# Patient Record
Sex: FEMALE | Race: WHITE | NOT HISPANIC OR LATINO | Employment: FULL TIME | URBAN - METROPOLITAN AREA
[De-identification: names, ages, dates, MRNs, and addresses within clinical notes are randomized per-mention and may not be internally consistent; named-entity substitution may affect disease eponyms.]

---

## 2017-06-30 ENCOUNTER — HOSPITAL ENCOUNTER (EMERGENCY)
Facility: HOSPITAL | Age: 47
Discharge: HOME | End: 2017-07-01
Payer: MEDICAID

## 2017-06-30 DIAGNOSIS — R51.9 HEADACHE: ICD-10-CM

## 2017-06-30 DIAGNOSIS — R19.7 DIARRHEA, UNSPECIFIED TYPE: ICD-10-CM

## 2017-06-30 DIAGNOSIS — R11.0 NAUSEA: ICD-10-CM

## 2017-06-30 DIAGNOSIS — B34.9 VIRAL SYNDROME: Primary | ICD-10-CM

## 2017-06-30 PROCEDURE — 96374 THER/PROPH/DIAG INJ IV PUSH: CPT

## 2017-06-30 PROCEDURE — 99284 EMERGENCY DEPT VISIT MOD MDM: CPT | Mod: 25

## 2017-06-30 PROCEDURE — 96361 HYDRATE IV INFUSION ADD-ON: CPT

## 2017-06-30 PROCEDURE — 96375 TX/PRO/DX INJ NEW DRUG ADDON: CPT

## 2017-07-01 VITALS
OXYGEN SATURATION: 97 % | HEART RATE: 57 BPM | DIASTOLIC BLOOD PRESSURE: 79 MMHG | RESPIRATION RATE: 18 BRPM | TEMPERATURE: 98 F | WEIGHT: 174 LBS | BODY MASS INDEX: 30.83 KG/M2 | HEIGHT: 63 IN | SYSTOLIC BLOOD PRESSURE: 118 MMHG

## 2017-07-01 LAB
ALBUMIN SERPL BCP-MCNC: 3.6 G/DL
ALP SERPL-CCNC: 52 U/L
ALT SERPL W/O P-5'-P-CCNC: 29 U/L
ANION GAP SERPL CALC-SCNC: 8 MMOL/L
AST SERPL-CCNC: 22 U/L
B-HCG UR QL: NEGATIVE
BASOPHILS # BLD AUTO: 0.02 K/UL
BASOPHILS NFR BLD: 0.3 %
BILIRUB SERPL-MCNC: 0.3 MG/DL
BILIRUB UR QL STRIP: NEGATIVE
BUN SERPL-MCNC: 11 MG/DL
CALCIUM SERPL-MCNC: 9.1 MG/DL
CHLORIDE SERPL-SCNC: 107 MMOL/L
CLARITY UR: CLEAR
CO2 SERPL-SCNC: 23 MMOL/L
COLOR UR: YELLOW
CREAT SERPL-MCNC: 0.8 MG/DL
DIFFERENTIAL METHOD: ABNORMAL
EOSINOPHIL # BLD AUTO: 0.1 K/UL
EOSINOPHIL NFR BLD: 2.1 %
ERYTHROCYTE [DISTWIDTH] IN BLOOD BY AUTOMATED COUNT: 12.8 %
EST. GFR  (AFRICAN AMERICAN): >60 ML/MIN/1.73 M^2
EST. GFR  (NON AFRICAN AMERICAN): >60 ML/MIN/1.73 M^2
GLUCOSE SERPL-MCNC: 83 MG/DL
GLUCOSE UR QL STRIP: NEGATIVE
HCT VFR BLD AUTO: 35.4 %
HGB BLD-MCNC: 12.5 G/DL
HGB UR QL STRIP: ABNORMAL
KETONES UR QL STRIP: NEGATIVE
LEUKOCYTE ESTERASE UR QL STRIP: NEGATIVE
LYMPHOCYTES # BLD AUTO: 2.5 K/UL
LYMPHOCYTES NFR BLD: 40.8 %
MCH RBC QN AUTO: 32.6 PG
MCHC RBC AUTO-ENTMCNC: 35.3 %
MCV RBC AUTO: 92 FL
MONOCYTES # BLD AUTO: 0.5 K/UL
MONOCYTES NFR BLD: 8.6 %
NEUTROPHILS # BLD AUTO: 2.9 K/UL
NEUTROPHILS NFR BLD: 48.2 %
NITRITE UR QL STRIP: NEGATIVE
PH UR STRIP: 7 [PH] (ref 5–8)
PLATELET # BLD AUTO: 214 K/UL
PMV BLD AUTO: 10.5 FL
POTASSIUM SERPL-SCNC: 3.7 MMOL/L
PROT SERPL-MCNC: 7.1 G/DL
PROT UR QL STRIP: NEGATIVE
RBC # BLD AUTO: 3.84 M/UL
SODIUM SERPL-SCNC: 138 MMOL/L
SP GR UR STRIP: 1.01 (ref 1–1.03)
URN SPEC COLLECT METH UR: ABNORMAL
UROBILINOGEN UR STRIP-ACNC: NEGATIVE EU/DL
WBC # BLD AUTO: 6.08 K/UL

## 2017-07-01 PROCEDURE — 25000003 PHARM REV CODE 250: Performed by: PHYSICIAN ASSISTANT

## 2017-07-01 PROCEDURE — 81025 URINE PREGNANCY TEST: CPT

## 2017-07-01 PROCEDURE — 63600175 PHARM REV CODE 636 W HCPCS: Performed by: PHYSICIAN ASSISTANT

## 2017-07-01 PROCEDURE — 80053 COMPREHEN METABOLIC PANEL: CPT

## 2017-07-01 PROCEDURE — 81003 URINALYSIS AUTO W/O SCOPE: CPT

## 2017-07-01 PROCEDURE — 85025 COMPLETE CBC W/AUTO DIFF WBC: CPT

## 2017-07-01 RX ORDER — KETOROLAC TROMETHAMINE 30 MG/ML
30 INJECTION, SOLUTION INTRAMUSCULAR; INTRAVENOUS
Status: COMPLETED | OUTPATIENT
Start: 2017-07-01 | End: 2017-07-01

## 2017-07-01 RX ORDER — METOCLOPRAMIDE 10 MG/1
10 TABLET ORAL EVERY 6 HOURS PRN
Qty: 10 TABLET | Refills: 0 | Status: SHIPPED | OUTPATIENT
Start: 2017-07-01

## 2017-07-01 RX ORDER — METOCLOPRAMIDE HYDROCHLORIDE 5 MG/ML
10 INJECTION INTRAMUSCULAR; INTRAVENOUS
Status: COMPLETED | OUTPATIENT
Start: 2017-07-01 | End: 2017-07-01

## 2017-07-01 RX ADMIN — KETOROLAC TROMETHAMINE 30 MG: 30 INJECTION, SOLUTION INTRAMUSCULAR; INTRAVENOUS at 03:07

## 2017-07-01 RX ADMIN — SODIUM CHLORIDE 1000 ML: 0.9 INJECTION, SOLUTION INTRAVENOUS at 01:07

## 2017-07-01 RX ADMIN — METOCLOPRAMIDE 10 MG: 5 INJECTION, SOLUTION INTRAMUSCULAR; INTRAVENOUS at 01:07

## 2017-07-01 NOTE — ED PROVIDER NOTES
History      Chief Complaint   Patient presents with    Headache     HA, n/v/d, chills, decreased appetite, x3 days       Review of patient's allergies indicates:  No Known Allergies     HPI   HPI    7/1/2017, 12:24 AM   History obtained from the patient      History of Present Illness: Morenita White is a 46 y.o. female patient who presents to the Emergency Department for headache for 3 days.  She denies hx of headaches  Also nausea, decreased appetite for 3 days. She was sent by urgent care for workup.  SHe denies neck stiffness, head injury, fever, vomiting.  Triage note says vomiting but pt denies, says only nausea and diarrhea. Symptoms are moderate in severity.     No further complaints or concerns at this time.           PCP: Benitez Harris NP       Past Medical History:  No past medical history on file.      Past Surgical History:  No past surgical history on file.        Family History:  No family history on file.        Social History:  Social History     Social History Main Topics    Smoking status: Not on file    Smokeless tobacco: Not on file    Alcohol use Not on file    Drug use: Unknown    Sexual activity: Not on file       ROS     Review of Systems   Constitutional: Positive for appetite change and chills. Negative for fever.   HENT: Negative for sore throat.    Respiratory: Negative for shortness of breath.    Cardiovascular: Negative for chest pain.   Gastrointestinal: Positive for diarrhea and nausea. Negative for vomiting.   Genitourinary: Negative for dysuria.   Musculoskeletal: Negative for back pain.   Skin: Negative for rash.   Neurological: Positive for headaches. Negative for dizziness, tremors, seizures, syncope, facial asymmetry, speech difficulty, weakness, light-headedness and numbness.   Hematological: Does not bruise/bleed easily.   All other systems reviewed and are negative.      Physical Exam      Initial Vitals [06/30/17 2300]   BP Pulse Resp Temp SpO2   (!) 142/73  "74 18 97.9 °F (36.6 °C) 97 %      MAP       96         Physical Exam  Vital signs and nursing notes reviewed.  Constitutional: Patient is in NAD. Awake and alert. Well-developed and well-nourished.  Head: Atraumatic. Normocephalic.  Eyes: PERRL. EOM intact. Conjunctivae nl. No scleral icterus.  ENT: Mucous membranes are moist. Oropharynx is clear.  TMs clear  Neck: Supple. No JVD. No lymphadenopathy.  No meningismus  Cardiovascular: Regular rate and rhythm. No murmurs, rubs, or gallops. Distal pulses are 2+ and symmetric.  Pulmonary/Chest: No respiratory distress. Clear to auscultation bilaterally. No wheezing, rales, or rhonchi.  Abdominal: Soft. Non-distended. No TTP. No rebound, guarding, or rigidity. Good bowel sounds.  Genitourinary: No CVA tenderness  Musculoskeletal: Moves all extremities. No edema.   Skin: Warm and dry.  Neurological: Awake and alert. No acute focal neurological deficits are appreciated. No facial droop.  Tongue is midline.  No pronator drift.  Finger to nose normal.  Hand  equal and strong, 5/5 motor strength x 4.      Psychiatric: Normal affect. Good eye contact. Appropriate in content.      ED Course          Procedures  ED Vital Signs:  Vitals:    06/30/17 2300 07/01/17 0323   BP: (!) 142/73 118/79   Pulse: 74 (!) 57   Resp: 18 18   Temp: 97.9 °F (36.6 °C)    TempSrc: Oral    SpO2: 97% 97%   Weight: 78.9 kg (174 lb)    Height: 5' 3" (1.6 m)          Results for orders placed or performed during the hospital encounter of 06/30/17   CBC auto differential   Result Value Ref Range    WBC 6.08 3.90 - 12.70 K/uL    RBC 3.84 (L) 4.00 - 5.40 M/uL    Hemoglobin 12.5 12.0 - 16.0 g/dL    Hematocrit 35.4 (L) 37.0 - 48.5 %    MCV 92 82 - 98 fL    MCH 32.6 (H) 27.0 - 31.0 pg    MCHC 35.3 32.0 - 36.0 %    RDW 12.8 11.5 - 14.5 %    Platelets 214 150 - 350 K/uL    MPV 10.5 9.2 - 12.9 fL    Gran # 2.9 1.8 - 7.7 K/uL    Lymph # 2.5 1.0 - 4.8 K/uL    Mono # 0.5 0.3 - 1.0 K/uL    Eos # 0.1 0.0 - 0.5 " K/uL    Baso # 0.02 0.00 - 0.20 K/uL    Gran% 48.2 38.0 - 73.0 %    Lymph% 40.8 18.0 - 48.0 %    Mono% 8.6 4.0 - 15.0 %    Eosinophil% 2.1 0.0 - 8.0 %    Basophil% 0.3 0.0 - 1.9 %    Differential Method Automated    Comprehensive metabolic panel   Result Value Ref Range    Sodium 138 136 - 145 mmol/L    Potassium 3.7 3.5 - 5.1 mmol/L    Chloride 107 95 - 110 mmol/L    CO2 23 23 - 29 mmol/L    Glucose 83 70 - 110 mg/dL    BUN, Bld 11 6 - 20 mg/dL    Creatinine 0.8 0.5 - 1.4 mg/dL    Calcium 9.1 8.7 - 10.5 mg/dL    Total Protein 7.1 6.0 - 8.4 g/dL    Albumin 3.6 3.5 - 5.2 g/dL    Total Bilirubin 0.3 0.1 - 1.0 mg/dL    Alkaline Phosphatase 52 (L) 55 - 135 U/L    AST 22 10 - 40 U/L    ALT 29 10 - 44 U/L    Anion Gap 8 8 - 16 mmol/L    eGFR if African American >60 >60 mL/min/1.73 m^2    eGFR if non African American >60 >60 mL/min/1.73 m^2   Urinalysis   Result Value Ref Range    Specimen UA Urine, Clean Catch     Color, UA Yellow Yellow, Straw, Mony    Appearance, UA Clear Clear    pH, UA 7.0 5.0 - 8.0    Specific Gravity, UA 1.015 1.005 - 1.030    Protein, UA Negative Negative    Glucose, UA Negative Negative    Ketones, UA Negative Negative    Bilirubin (UA) Negative Negative    Occult Blood UA Trace (A) Negative    Nitrite, UA Negative Negative    Urobilinogen, UA Negative <2.0 EU/dL    Leukocytes, UA Negative Negative   Pregnancy, urine rapid   Result Value Ref Range    Preg Test, Ur Negative            Imaging Results:  Imaging Results          CT Head Without Contrast (Final result)  Result time 07/01/17 08:53:36    Final result by David Mahoney MD (07/01/17 08:53:36)                 Impression:     Normal CT of the brain      Electronically signed by: DAVID MAHONEY MD  Date:     07/01/17  Time:    08:53              Narrative:    CT of the head without contrast    Clinical indication: Headache x3 days    Findings: The ventricles are normal in size and position without midline shift.  Cerebral density is  normal.  No hemorrhage or mass effect.                                 The Emergency Provider reviewed the vital signs and test results, which are outlined above.    ED Discussion             Medication(s) given in the ER:  Medications   sodium chloride 0.9% bolus 1,000 mL (0 mLs Intravenous Stopped 7/1/17 0255)   metoclopramide HCl injection 10 mg (10 mg Intravenous Given 7/1/17 0113)   ketorolac injection 30 mg (30 mg Intravenous Given 7/1/17 0319)           Follow-up Information     Benitez Harris NP In 2 days.    Specialty:  Family Medicine  Contact information:  [address] [facility]  [place] [address]  [phone]             [facility]    Specialty:  Emergency Medicine  Why:  If symptoms worsen  Contact information:  [address]  [facility] [address] [phone]                     Discharge Medication List as of 7/1/2017  3:06 AM      START taking these medications    Details   metoclopramide HCl (REGLAN) 10 MG tablet Take 1 tablet (10 mg total) by mouth every 6 (six) hours as needed. Prn headache or nausea, Starting Sat 7/1/2017, Print                Medical Decision Making      Pt says she feels much better and ready for discharge.  Headache resolved.    All findings were reviewed with the patient/family in detail.   All remaining questions and concerns were addressed at that time.  Patient/family has been counseled regarding the need for follow-up as well as the indication to return to the emergency room should new or worrisome developments occur.        MDM               Clinical Impression:        ICD-10-CM ICD-9-CM   1. Viral syndrome B34.9 079.99   2. Headache R51 784.0   3. Diarrhea, unspecified type R19.7 787.91   4. Nausea R11.0 787.02             Andressa Forrester PA-C  07/01/17 2040

## 2017-08-13 ENCOUNTER — HOSPITAL ENCOUNTER (EMERGENCY)
Facility: HOSPITAL | Age: 47
Discharge: HOME | End: 2017-08-13
Attending: EMERGENCY MEDICINE
Payer: MEDICAID

## 2017-08-13 VITALS
TEMPERATURE: 99 F | WEIGHT: 170 LBS | HEART RATE: 94 BPM | OXYGEN SATURATION: 97 % | RESPIRATION RATE: 20 BRPM | BODY MASS INDEX: 30.12 KG/M2 | DIASTOLIC BLOOD PRESSURE: 61 MMHG | SYSTOLIC BLOOD PRESSURE: 109 MMHG | HEIGHT: 63 IN

## 2017-08-13 DIAGNOSIS — R50.9 FEVER: ICD-10-CM

## 2017-08-13 DIAGNOSIS — J06.9 UPPER RESPIRATORY TRACT INFECTION, UNSPECIFIED TYPE: Primary | ICD-10-CM

## 2017-08-13 LAB
ALBUMIN SERPL BCP-MCNC: 3.4 G/DL
ALP SERPL-CCNC: 139 U/L
ALT SERPL W/O P-5'-P-CCNC: 299 U/L
ANION GAP SERPL CALC-SCNC: 10 MMOL/L
AST SERPL-CCNC: 394 U/L
BASOPHILS # BLD AUTO: 0.02 K/UL
BASOPHILS NFR BLD: 0.8 %
BILIRUB SERPL-MCNC: 0.3 MG/DL
BILIRUB UR QL STRIP: NEGATIVE
BUN SERPL-MCNC: 9 MG/DL
CALCIUM SERPL-MCNC: 8.6 MG/DL
CHLORIDE SERPL-SCNC: 106 MMOL/L
CLARITY UR: CLEAR
CO2 SERPL-SCNC: 20 MMOL/L
COLOR UR: YELLOW
CREAT SERPL-MCNC: 0.8 MG/DL
DIFFERENTIAL METHOD: ABNORMAL
EOSINOPHIL # BLD AUTO: 0 K/UL
EOSINOPHIL NFR BLD: 0 %
ERYTHROCYTE [DISTWIDTH] IN BLOOD BY AUTOMATED COUNT: 13.1 %
EST. GFR  (AFRICAN AMERICAN): >60 ML/MIN/1.73 M^2
EST. GFR  (NON AFRICAN AMERICAN): >60 ML/MIN/1.73 M^2
FLUAV AG SPEC QL IA: NEGATIVE
FLUBV AG SPEC QL IA: NEGATIVE
GLUCOSE SERPL-MCNC: 92 MG/DL
GLUCOSE UR QL STRIP: NEGATIVE
HCT VFR BLD AUTO: 33.7 %
HETEROPH AB SERPL QL IA: NEGATIVE
HGB BLD-MCNC: 11.7 G/DL
HGB UR QL STRIP: ABNORMAL
KETONES UR QL STRIP: ABNORMAL
LACTATE SERPL-SCNC: 0.8 MMOL/L
LEUKOCYTE ESTERASE UR QL STRIP: ABNORMAL
LYMPHOCYTES # BLD AUTO: 0.2 K/UL
LYMPHOCYTES NFR BLD: 8.7 %
MCH RBC QN AUTO: 32.6 PG
MCHC RBC AUTO-ENTMCNC: 34.7 G/DL
MCV RBC AUTO: 94 FL
MICROSCOPIC COMMENT: ABNORMAL
MONOCYTES # BLD AUTO: 0.2 K/UL
MONOCYTES NFR BLD: 8.7 %
NEUTROPHILS # BLD AUTO: 2.1 K/UL
NEUTROPHILS NFR BLD: 81.8 %
NITRITE UR QL STRIP: NEGATIVE
PH UR STRIP: 6 [PH] (ref 5–8)
PLATELET # BLD AUTO: 134 K/UL
PMV BLD AUTO: 10.5 FL
POTASSIUM SERPL-SCNC: 3.8 MMOL/L
PROT SERPL-MCNC: 6.8 G/DL
PROT UR QL STRIP: ABNORMAL
RBC # BLD AUTO: 3.59 M/UL
RBC #/AREA URNS HPF: 9 /HPF (ref 0–4)
SODIUM SERPL-SCNC: 136 MMOL/L
SP GR UR STRIP: 1.01 (ref 1–1.03)
SPECIMEN SOURCE: NORMAL
URN SPEC COLLECT METH UR: ABNORMAL
UROBILINOGEN UR STRIP-ACNC: NEGATIVE EU/DL
WBC # BLD AUTO: 2.53 K/UL
WBC #/AREA URNS HPF: 3 /HPF (ref 0–5)

## 2017-08-13 PROCEDURE — 99284 EMERGENCY DEPT VISIT MOD MDM: CPT | Mod: 25

## 2017-08-13 PROCEDURE — 81000 URINALYSIS NONAUTO W/SCOPE: CPT

## 2017-08-13 PROCEDURE — 87400 INFLUENZA A/B EACH AG IA: CPT

## 2017-08-13 PROCEDURE — 86308 HETEROPHILE ANTIBODY SCREEN: CPT

## 2017-08-13 PROCEDURE — 25000003 PHARM REV CODE 250: Performed by: EMERGENCY MEDICINE

## 2017-08-13 PROCEDURE — 36415 COLL VENOUS BLD VENIPUNCTURE: CPT

## 2017-08-13 PROCEDURE — 87040 BLOOD CULTURE FOR BACTERIA: CPT | Mod: 59

## 2017-08-13 PROCEDURE — 85025 COMPLETE CBC W/AUTO DIFF WBC: CPT

## 2017-08-13 PROCEDURE — 96374 THER/PROPH/DIAG INJ IV PUSH: CPT

## 2017-08-13 PROCEDURE — 80053 COMPREHEN METABOLIC PANEL: CPT

## 2017-08-13 PROCEDURE — 83605 ASSAY OF LACTIC ACID: CPT

## 2017-08-13 PROCEDURE — 63600175 PHARM REV CODE 636 W HCPCS: Performed by: EMERGENCY MEDICINE

## 2017-08-13 RX ORDER — BENZONATATE 100 MG/1
100 CAPSULE ORAL 3 TIMES DAILY PRN
Qty: 20 CAPSULE | Refills: 0 | Status: SHIPPED | OUTPATIENT
Start: 2017-08-13 | End: 2017-08-23

## 2017-08-13 RX ORDER — IBUPROFEN 600 MG/1
600 TABLET ORAL
Status: COMPLETED | OUTPATIENT
Start: 2017-08-13 | End: 2017-08-13

## 2017-08-13 RX ORDER — CITALOPRAM 10 MG/1
10 TABLET ORAL DAILY
COMMUNITY

## 2017-08-13 RX ORDER — DOXEPIN HYDROCHLORIDE 25 MG/1
25 CAPSULE ORAL NIGHTLY
COMMUNITY

## 2017-08-13 RX ORDER — ONDANSETRON 4 MG/1
4 TABLET, FILM COATED ORAL EVERY 8 HOURS PRN
Qty: 12 TABLET | Refills: 0 | Status: SHIPPED | OUTPATIENT
Start: 2017-08-13

## 2017-08-13 RX ORDER — KETOROLAC TROMETHAMINE 30 MG/ML
30 INJECTION, SOLUTION INTRAMUSCULAR; INTRAVENOUS
Status: COMPLETED | OUTPATIENT
Start: 2017-08-13 | End: 2017-08-13

## 2017-08-13 RX ADMIN — SODIUM CHLORIDE 1000 ML: 0.9 INJECTION, SOLUTION INTRAVENOUS at 07:08

## 2017-08-13 RX ADMIN — KETOROLAC TROMETHAMINE 30 MG: 30 INJECTION, SOLUTION INTRAMUSCULAR at 09:08

## 2017-08-13 RX ADMIN — IBUPROFEN 600 MG: 600 TABLET, FILM COATED ORAL at 07:08

## 2017-08-14 ENCOUNTER — HOSPITAL ENCOUNTER (EMERGENCY)
Facility: HOSPITAL | Age: 47
Discharge: HOME | End: 2017-08-14
Attending: EMERGENCY MEDICINE
Payer: MEDICAID

## 2017-08-14 VITALS
HEART RATE: 102 BPM | RESPIRATION RATE: 22 BRPM | SYSTOLIC BLOOD PRESSURE: 132 MMHG | HEIGHT: 63 IN | WEIGHT: 170 LBS | TEMPERATURE: 99 F | BODY MASS INDEX: 30.12 KG/M2 | OXYGEN SATURATION: 96 % | DIASTOLIC BLOOD PRESSURE: 76 MMHG

## 2017-08-14 DIAGNOSIS — B34.9 ACUTE VIRAL SYNDROME: Primary | ICD-10-CM

## 2017-08-14 DIAGNOSIS — J06.9 UPPER RESPIRATORY TRACT INFECTION, UNSPECIFIED TYPE: ICD-10-CM

## 2017-08-14 PROCEDURE — 63600175 PHARM REV CODE 636 W HCPCS: Performed by: EMERGENCY MEDICINE

## 2017-08-14 PROCEDURE — 96372 THER/PROPH/DIAG INJ SC/IM: CPT

## 2017-08-14 PROCEDURE — 99283 EMERGENCY DEPT VISIT LOW MDM: CPT | Mod: 25

## 2017-08-14 RX ORDER — PROMETHAZINE HYDROCHLORIDE AND DEXTROMETHORPHAN HYDROBROMIDE 6.25; 15 MG/5ML; MG/5ML
5 SYRUP ORAL
Qty: 120 ML | Refills: 0 | Status: SHIPPED | OUTPATIENT
Start: 2017-08-14 | End: 2017-08-24

## 2017-08-14 RX ORDER — DEXAMETHASONE SODIUM PHOSPHATE 4 MG/ML
8 INJECTION, SOLUTION INTRA-ARTICULAR; INTRALESIONAL; INTRAMUSCULAR; INTRAVENOUS; SOFT TISSUE
Status: COMPLETED | OUTPATIENT
Start: 2017-08-14 | End: 2017-08-14

## 2017-08-14 RX ADMIN — DEXAMETHASONE SODIUM PHOSPHATE 8 MG: 4 INJECTION, SOLUTION INTRA-ARTICULAR; INTRALESIONAL; INTRAMUSCULAR; INTRAVENOUS; SOFT TISSUE at 10:08

## 2017-08-14 NOTE — DISCHARGE INSTRUCTIONS
Regarding UPPER RESPIRATORY ILLNESS, for treatment, I encouraged patient to: drink plenty of fluids; get lots of rest; take medications as prescribed; use over-the-counter medications for symptomatic treatment;  and use a humidifier or steam in the bathroom to improve patency of upper airway.  Patient instructed to notify primary care provider, or return to the emergency department, if the they: have a cough most days or have a cough that returns frequently; begin coughing up blood; develop a high fever or shaking chills; have a low-grade fever for three or more days; develop thick, greenish mucus, especially if it has a bad smell; and experience shortness of breath or chest pain. For prevention, discussed with patient the importance of refraining from smoking (if applicable), getting annual influenza vaccines, reducing exposure to air pollution, and the need to frequently wash hands to avoid spread of infection.     Regarding FEVER, instructed patient and/or caregiver on techniques to manage elevated temperatures, including: bathing in cool or lukewarm water; using an ice pack wrapped in a small towel or wet a washcloth with cool water on forehead or the back of neck; drink liquids as directed to help prevent dehydration. Recommended that the patient seek immediate care if they experience any of the following symptoms: shortness of breath or chest pain; blue skin, lips, or nails; stiff neck and a bad headache; fever does not go away or gets worse even after treatment; confusion; decrease urinary output; and tachycardia. For infection prevention, I suggested the frequent use hand hygiene (washing hands often with soap and water and/or use an alcohol-based gel; wear a mask to help prevent the spread of a virus; and if applicable, cook and handle food properly and clean surfaces where food is prepared with a disinfectant . For management, discussed use of antipyretics and reiterated importance of taking  medications as directed.    Rest  Drink plenty of clear fluids   Nasal saline spray to clear nasal drainage and help with nasal congestion  Zyrtec or Claritin to help dry mucus and post nasal drip  Mucinex or Mucinex DM for cough and chest congestion  Tylenol or Ibuprofen for fever, headache and body aches  Warm salt water gargles for throat comfort  Chloraseptic spray or lozenges for throat comfort  RTC with no improvement or worsening

## 2017-08-14 NOTE — ED NOTES
Pt states started last night with dry cough, headache and fever. States feels like hurting all over. States had a fever of 103.1 prior to arrival.  BBS = clear.   Armband checked, patient verified. Verified by spelling and stated name on armband along with .   Patient sitting up in bed, no acute distress noted, awake, alert, and oriented x 3, calm, respirations even and unlabored, and skin is warm and dry. Patient verbalized understanding of status and plan of care. Side rails up x 2, call light in reach, bed low and locked. Family at bedside. Will continue to monitor.
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
## 2017-08-19 LAB
BACTERIA BLD CULT: NORMAL
BACTERIA BLD CULT: NORMAL

## 2018-02-06 ENCOUNTER — HOSPITAL ENCOUNTER (EMERGENCY)
Facility: HOSPITAL | Age: 48
Discharge: HOME | End: 2018-02-06
Attending: EMERGENCY MEDICINE
Payer: MEDICAID

## 2018-02-06 VITALS
SYSTOLIC BLOOD PRESSURE: 130 MMHG | DIASTOLIC BLOOD PRESSURE: 76 MMHG | HEIGHT: 63 IN | TEMPERATURE: 98 F | RESPIRATION RATE: 18 BRPM | HEART RATE: 79 BPM | BODY MASS INDEX: 31.13 KG/M2 | OXYGEN SATURATION: 98 % | WEIGHT: 175.69 LBS

## 2018-02-06 DIAGNOSIS — J20.9 ACUTE BRONCHITIS WITH COPD: Primary | ICD-10-CM

## 2018-02-06 DIAGNOSIS — R06.02 SOB (SHORTNESS OF BREATH): ICD-10-CM

## 2018-02-06 DIAGNOSIS — J44.0 ACUTE BRONCHITIS WITH COPD: Primary | ICD-10-CM

## 2018-02-06 PROCEDURE — 99284 EMERGENCY DEPT VISIT MOD MDM: CPT | Mod: 25

## 2018-02-06 PROCEDURE — 93010 ELECTROCARDIOGRAM REPORT: CPT | Mod: ,,, | Performed by: INTERNAL MEDICINE

## 2018-02-06 PROCEDURE — 93005 ELECTROCARDIOGRAM TRACING: CPT

## 2018-02-06 PROCEDURE — 94640 AIRWAY INHALATION TREATMENT: CPT

## 2018-02-06 PROCEDURE — 63600175 PHARM REV CODE 636 W HCPCS: Performed by: EMERGENCY MEDICINE

## 2018-02-06 PROCEDURE — 96372 THER/PROPH/DIAG INJ SC/IM: CPT

## 2018-02-06 PROCEDURE — 25000242 PHARM REV CODE 250 ALT 637 W/ HCPCS: Performed by: EMERGENCY MEDICINE

## 2018-02-06 RX ORDER — KETOROLAC TROMETHAMINE 30 MG/ML
60 INJECTION, SOLUTION INTRAMUSCULAR; INTRAVENOUS ONCE
Status: COMPLETED | OUTPATIENT
Start: 2018-02-06 | End: 2018-02-06

## 2018-02-06 RX ORDER — IPRATROPIUM BROMIDE AND ALBUTEROL SULFATE 2.5; .5 MG/3ML; MG/3ML
3 SOLUTION RESPIRATORY (INHALATION) EVERY 6 HOURS PRN
Qty: 1 BOX | Refills: 0 | Status: SHIPPED | OUTPATIENT
Start: 2018-02-06 | End: 2019-02-06

## 2018-02-06 RX ORDER — PREDNISONE 20 MG/1
60 TABLET ORAL
Status: COMPLETED | OUTPATIENT
Start: 2018-02-06 | End: 2018-02-06

## 2018-02-06 RX ORDER — PREDNISONE 50 MG/1
50 TABLET ORAL DAILY
Qty: 5 TABLET | Refills: 0 | Status: SHIPPED | OUTPATIENT
Start: 2018-02-06 | End: 2018-02-16

## 2018-02-06 RX ORDER — IPRATROPIUM BROMIDE AND ALBUTEROL SULFATE 2.5; .5 MG/3ML; MG/3ML
3 SOLUTION RESPIRATORY (INHALATION) ONCE
Status: COMPLETED | OUTPATIENT
Start: 2018-02-06 | End: 2018-02-06

## 2018-02-06 RX ADMIN — KETOROLAC TROMETHAMINE 60 MG: 30 INJECTION, SOLUTION INTRAMUSCULAR; INTRAVENOUS at 07:02

## 2018-02-06 RX ADMIN — IPRATROPIUM BROMIDE AND ALBUTEROL SULFATE 3 ML: .5; 3 SOLUTION RESPIRATORY (INHALATION) at 07:02

## 2018-02-06 RX ADMIN — PREDNISONE 60 MG: 20 TABLET ORAL at 07:02
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
## 2018-06-09 ENCOUNTER — HOSPITAL ENCOUNTER (EMERGENCY)
Facility: HOSPITAL | Age: 48
Discharge: HOME | End: 2018-06-10
Attending: EMERGENCY MEDICINE
Payer: MEDICAID

## 2018-06-09 DIAGNOSIS — N76.0 VAGINOSIS: Primary | ICD-10-CM

## 2018-06-09 LAB
ALBUMIN SERPL BCP-MCNC: 3.9 G/DL
ALP SERPL-CCNC: 92 U/L
ALT SERPL W/O P-5'-P-CCNC: 12 U/L
ANION GAP SERPL CALC-SCNC: 12 MMOL/L
AST SERPL-CCNC: 14 U/L
B-HCG UR QL: NEGATIVE
BACTERIA #/AREA URNS HPF: ABNORMAL /HPF
BASOPHILS # BLD AUTO: 0.03 K/UL
BASOPHILS NFR BLD: 0.5 %
BILIRUB SERPL-MCNC: 0.3 MG/DL
BILIRUB UR QL STRIP: NEGATIVE
BUN SERPL-MCNC: 14 MG/DL
CALCIUM SERPL-MCNC: 9.1 MG/DL
CHLORIDE SERPL-SCNC: 113 MMOL/L
CK SERPL-CCNC: 91 U/L
CLARITY UR: CLEAR
CO2 SERPL-SCNC: 12 MMOL/L
COLOR UR: YELLOW
CREAT SERPL-MCNC: 1.2 MG/DL
DIFFERENTIAL METHOD: ABNORMAL
EOSINOPHIL # BLD AUTO: 0.2 K/UL
EOSINOPHIL NFR BLD: 2.5 %
ERYTHROCYTE [DISTWIDTH] IN BLOOD BY AUTOMATED COUNT: 13.9 %
EST. GFR  (AFRICAN AMERICAN): >60 ML/MIN/1.73 M^2
EST. GFR  (NON AFRICAN AMERICAN): 54 ML/MIN/1.73 M^2
GLUCOSE SERPL-MCNC: 80 MG/DL
GLUCOSE UR QL STRIP: NEGATIVE
HCT VFR BLD AUTO: 34.8 %
HGB BLD-MCNC: 11.7 G/DL
HGB UR QL STRIP: NEGATIVE
KETONES UR QL STRIP: NEGATIVE
LEUKOCYTE ESTERASE UR QL STRIP: NEGATIVE
LYMPHOCYTES # BLD AUTO: 2.1 K/UL
LYMPHOCYTES NFR BLD: 32.3 %
MCH RBC QN AUTO: 31.7 PG
MCHC RBC AUTO-ENTMCNC: 33.6 G/DL
MCV RBC AUTO: 94 FL
MICROSCOPIC COMMENT: ABNORMAL
MONOCYTES # BLD AUTO: 0.4 K/UL
MONOCYTES NFR BLD: 6.3 %
NEUTROPHILS # BLD AUTO: 3.7 K/UL
NEUTROPHILS NFR BLD: 58.4 %
NITRITE UR QL STRIP: POSITIVE
PH UR STRIP: 6 [PH] (ref 5–8)
PLATELET # BLD AUTO: 283 K/UL
PMV BLD AUTO: 10 FL
POTASSIUM SERPL-SCNC: 3.9 MMOL/L
PROT SERPL-MCNC: 7.7 G/DL
PROT UR QL STRIP: ABNORMAL
RBC # BLD AUTO: 3.69 M/UL
RBC #/AREA URNS HPF: 4 /HPF (ref 0–4)
SODIUM SERPL-SCNC: 137 MMOL/L
SP GR UR STRIP: 1.02 (ref 1–1.03)
URN SPEC COLLECT METH UR: ABNORMAL
UROBILINOGEN UR STRIP-ACNC: 1 EU/DL
WBC # BLD AUTO: 6.37 K/UL
WBC #/AREA URNS HPF: 2 /HPF (ref 0–5)

## 2018-06-09 PROCEDURE — 80053 COMPREHEN METABOLIC PANEL: CPT

## 2018-06-09 PROCEDURE — 85025 COMPLETE CBC W/AUTO DIFF WBC: CPT

## 2018-06-09 PROCEDURE — 82550 ASSAY OF CK (CPK): CPT

## 2018-06-09 PROCEDURE — 80307 DRUG TEST PRSMV CHEM ANLYZR: CPT

## 2018-06-09 PROCEDURE — 99284 EMERGENCY DEPT VISIT MOD MDM: CPT | Mod: 25

## 2018-06-09 PROCEDURE — 85651 RBC SED RATE NONAUTOMATED: CPT

## 2018-06-09 PROCEDURE — 81025 URINE PREGNANCY TEST: CPT

## 2018-06-09 PROCEDURE — 81000 URINALYSIS NONAUTO W/SCOPE: CPT

## 2018-06-10 VITALS
SYSTOLIC BLOOD PRESSURE: 150 MMHG | OXYGEN SATURATION: 97 % | DIASTOLIC BLOOD PRESSURE: 72 MMHG | BODY MASS INDEX: 29.79 KG/M2 | TEMPERATURE: 99 F | WEIGHT: 168.13 LBS | HEIGHT: 63 IN | HEART RATE: 85 BPM | RESPIRATION RATE: 20 BRPM

## 2018-06-10 LAB
ALLENS TEST: ABNORMAL
ALLENS TEST: ABNORMAL
AMPHET+METHAMPHET UR QL: NORMAL
BACTERIA GENITAL QL WET PREP: ABNORMAL
BARBITURATES UR QL SCN>200 NG/ML: NEGATIVE
BENZODIAZ UR QL SCN>200 NG/ML: NEGATIVE
BZE UR QL SCN: NEGATIVE
CANNABINOIDS UR QL SCN: NEGATIVE
CLUE CELLS VAG QL WET PREP: ABNORMAL
CREAT UR-MCNC: 96.4 MG/DL
DELSYS: ABNORMAL
DELSYS: ABNORMAL
ERYTHROCYTE [SEDIMENTATION RATE] IN BLOOD BY WESTERGREN METHOD: 42 MM/HR
FILAMENT FUNGI VAG WET PREP-#/AREA: ABNORMAL
FIO2: 21
FIO2: 21
HCO3 UR-SCNC: 12.7 MMOL/L (ref 24–28)
HCO3 UR-SCNC: 13.5 MMOL/L (ref 24–28)
METHADONE UR QL SCN>300 NG/ML: NEGATIVE
MODE: ABNORMAL
MODE: ABNORMAL
OPIATES UR QL SCN: NEGATIVE
PCO2 BLDA: 25 MMHG (ref 35–45)
PCO2 BLDA: 29.4 MMHG (ref 35–45)
PCP UR QL SCN>25 NG/ML: NEGATIVE
PH SMN: 7.27 [PH] (ref 7.35–7.45)
PH SMN: 7.31 [PH] (ref 7.35–7.45)
PO2 BLDA: 104 MMHG (ref 80–100)
PO2 BLDA: 34 MMHG (ref 80–100)
POC BE: -13 MMOL/L
POC BE: -14 MMOL/L
POC SATURATED O2: 59 % (ref 95–100)
POC SATURATED O2: 98 % (ref 95–100)
SAMPLE: ABNORMAL
SAMPLE: ABNORMAL
SITE: ABNORMAL
SITE: ABNORMAL
SPECIMEN SOURCE: ABNORMAL
T VAGINALIS GENITAL QL WET PREP: ABNORMAL
TOXICOLOGY INFORMATION: NORMAL
WBC #/AREA VAG WET PREP: ABNORMAL
YEAST GENITAL QL WET PREP: ABNORMAL

## 2018-06-10 PROCEDURE — 99900035 HC TECH TIME PER 15 MIN (STAT)

## 2018-06-10 PROCEDURE — 87210 SMEAR WET MOUNT SALINE/INK: CPT

## 2018-06-10 PROCEDURE — 96374 THER/PROPH/DIAG INJ IV PUSH: CPT

## 2018-06-10 PROCEDURE — 63600175 PHARM REV CODE 636 W HCPCS: Performed by: EMERGENCY MEDICINE

## 2018-06-10 PROCEDURE — 82803 BLOOD GASES ANY COMBINATION: CPT

## 2018-06-10 PROCEDURE — 87491 CHLMYD TRACH DNA AMP PROBE: CPT

## 2018-06-10 PROCEDURE — 36600 WITHDRAWAL OF ARTERIAL BLOOD: CPT

## 2018-06-10 RX ORDER — KETOROLAC TROMETHAMINE 30 MG/ML
15 INJECTION, SOLUTION INTRAMUSCULAR; INTRAVENOUS
Status: COMPLETED | OUTPATIENT
Start: 2018-06-10 | End: 2018-06-10

## 2018-06-10 RX ORDER — METRONIDAZOLE 7.5 MG/G
1 GEL VAGINAL 2 TIMES DAILY
Qty: 70 G | Refills: 0 | Status: SHIPPED | OUTPATIENT
Start: 2018-06-10

## 2018-06-10 RX ADMIN — KETOROLAC TROMETHAMINE 15 MG: 30 INJECTION, SOLUTION INTRAMUSCULAR; INTRAVENOUS at 12:06
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
## 2018-06-10 NOTE — ED NOTES
Patient wanting to leave, states tired of waiting and wants to go home. No relief from the tordol IV. Dr. Patel notified.

## 2018-06-11 LAB
C TRACH DNA SPEC QL NAA+PROBE: NOT DETECTED
N GONORRHOEA DNA SPEC QL NAA+PROBE: NOT DETECTED

## 2022-06-11 ENCOUNTER — HOSPITAL ENCOUNTER (EMERGENCY)
Facility: HOSPITAL | Age: 52
Discharge: HOME | End: 2022-06-11
Attending: EMERGENCY MEDICINE
Payer: MEDICAID

## 2022-06-11 VITALS
BODY MASS INDEX: 27.29 KG/M2 | HEIGHT: 63 IN | HEART RATE: 75 BPM | SYSTOLIC BLOOD PRESSURE: 178 MMHG | DIASTOLIC BLOOD PRESSURE: 73 MMHG | OXYGEN SATURATION: 99 % | RESPIRATION RATE: 18 BRPM | TEMPERATURE: 98 F | WEIGHT: 154 LBS

## 2022-06-11 DIAGNOSIS — M25.461 EFFUSION OF RIGHT KNEE: Primary | ICD-10-CM

## 2022-06-11 DIAGNOSIS — M25.561 RIGHT KNEE PAIN: ICD-10-CM

## 2022-06-11 PROCEDURE — 99284 EMERGENCY DEPT VISIT MOD MDM: CPT | Mod: 25

## 2022-06-11 PROCEDURE — 25000003 PHARM REV CODE 250: Performed by: NURSE PRACTITIONER

## 2022-06-11 RX ORDER — IBUPROFEN 800 MG/1
800 TABLET ORAL EVERY 6 HOURS PRN
Qty: 20 TABLET | Refills: 0 | Status: SHIPPED | OUTPATIENT
Start: 2022-06-11

## 2022-06-11 RX ORDER — HYDROCODONE BITARTRATE AND ACETAMINOPHEN 5; 325 MG/1; MG/1
1 TABLET ORAL
Status: COMPLETED | OUTPATIENT
Start: 2022-06-11 | End: 2022-06-11

## 2022-06-11 RX ORDER — TRAMADOL HYDROCHLORIDE 50 MG/1
50 TABLET ORAL EVERY 6 HOURS PRN
Qty: 12 TABLET | Refills: 0 | Status: SHIPPED | OUTPATIENT
Start: 2022-06-11

## 2022-06-11 RX ADMIN — HYDROCODONE BITARTRATE AND ACETAMINOPHEN 1 TABLET: 5; 325 TABLET ORAL at 05:06
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
## 2022-10-07 ENCOUNTER — HOSPITAL ENCOUNTER (OUTPATIENT)
Facility: HOSPITAL | Age: 52
Discharge: HOME | End: 2022-10-09
Attending: EMERGENCY MEDICINE | Admitting: STUDENT IN AN ORGANIZED HEALTH CARE EDUCATION/TRAINING PROGRAM
Payer: MEDICAID

## 2022-10-07 DIAGNOSIS — R10.13 EPIGASTRIC PAIN: ICD-10-CM

## 2022-10-07 DIAGNOSIS — R00.0 SINUS TACHYCARDIA: ICD-10-CM

## 2022-10-07 DIAGNOSIS — R07.9 CHEST PAIN: ICD-10-CM

## 2022-10-07 DIAGNOSIS — R06.02 SHORTNESS OF BREATH: ICD-10-CM

## 2022-10-07 DIAGNOSIS — K92.1 MELENA: Primary | ICD-10-CM

## 2022-10-07 DIAGNOSIS — R79.89 ELEVATED TROPONIN: ICD-10-CM

## 2022-10-07 PROBLEM — J06.9 UPPER RESPIRATORY TRACT INFECTION: Status: RESOLVED | Noted: 2017-08-13 | Resolved: 2022-10-07

## 2022-10-07 PROBLEM — R50.9 FEVER: Status: RESOLVED | Noted: 2017-08-13 | Resolved: 2022-10-07

## 2022-10-07 PROBLEM — D64.9 NORMOCYTIC ANEMIA: Status: ACTIVE | Noted: 2022-10-07

## 2022-10-07 PROBLEM — J44.9 COPD (CHRONIC OBSTRUCTIVE PULMONARY DISEASE): Status: ACTIVE | Noted: 2022-10-07

## 2022-10-07 PROBLEM — F32.A DEPRESSION: Status: ACTIVE | Noted: 2022-10-07

## 2022-10-07 PROBLEM — F41.9 ANXIETY: Status: ACTIVE | Noted: 2022-10-07

## 2022-10-07 LAB
ABO + RH BLD: NORMAL
ALBUMIN SERPL BCP-MCNC: 3.8 G/DL (ref 3.5–5.2)
ALP SERPL-CCNC: 86 U/L (ref 55–135)
ALT SERPL W/O P-5'-P-CCNC: 19 U/L (ref 10–44)
ANION GAP SERPL CALC-SCNC: 14 MMOL/L (ref 8–16)
AST SERPL-CCNC: 31 U/L (ref 10–40)
BASOPHILS # BLD AUTO: 0.02 K/UL (ref 0–0.2)
BASOPHILS NFR BLD: 0.3 % (ref 0–1.9)
BILIRUB SERPL-MCNC: 0.6 MG/DL (ref 0.1–1)
BLD GP AB SCN CELLS X3 SERPL QL: NORMAL
BNP SERPL-MCNC: 37 PG/ML (ref 0–99)
BUN SERPL-MCNC: 36 MG/DL (ref 6–20)
CALCIUM SERPL-MCNC: 10.3 MG/DL (ref 8.7–10.5)
CHLORIDE SERPL-SCNC: 102 MMOL/L (ref 95–110)
CO2 SERPL-SCNC: 19 MMOL/L (ref 23–29)
CREAT SERPL-MCNC: 1.2 MG/DL (ref 0.5–1.4)
DIFFERENTIAL METHOD: ABNORMAL
EOSINOPHIL # BLD AUTO: 0 K/UL (ref 0–0.5)
EOSINOPHIL NFR BLD: 0.2 % (ref 0–8)
ERYTHROCYTE [DISTWIDTH] IN BLOOD BY AUTOMATED COUNT: 15 % (ref 11.5–14.5)
EST. GFR  (NO RACE VARIABLE): 55 ML/MIN/1.73 M^2
GLUCOSE SERPL-MCNC: 170 MG/DL (ref 70–110)
HCT VFR BLD AUTO: 29 % (ref 37–48.5)
HGB BLD-MCNC: 10 G/DL (ref 12–16)
IMM GRANULOCYTES # BLD AUTO: 0.03 K/UL (ref 0–0.04)
IMM GRANULOCYTES NFR BLD AUTO: 0.5 % (ref 0–0.5)
LIPASE SERPL-CCNC: 83 U/L (ref 4–60)
LYMPHOCYTES # BLD AUTO: 1.1 K/UL (ref 1–4.8)
LYMPHOCYTES NFR BLD: 15.8 % (ref 18–48)
MCH RBC QN AUTO: 28.6 PG (ref 27–31)
MCHC RBC AUTO-ENTMCNC: 34.5 G/DL (ref 32–36)
MCV RBC AUTO: 83 FL (ref 82–98)
MONOCYTES # BLD AUTO: 0.6 K/UL (ref 0.3–1)
MONOCYTES NFR BLD: 9 % (ref 4–15)
NEUTROPHILS # BLD AUTO: 5 K/UL (ref 1.8–7.7)
NEUTROPHILS NFR BLD: 74.2 % (ref 38–73)
NRBC BLD-RTO: 0 /100 WBC
OB PNL STL: POSITIVE
PLATELET # BLD AUTO: 246 K/UL (ref 150–450)
PMV BLD AUTO: 9.7 FL (ref 9.2–12.9)
POTASSIUM SERPL-SCNC: 3.2 MMOL/L (ref 3.5–5.1)
PROT SERPL-MCNC: 7.6 G/DL (ref 6–8.4)
RBC # BLD AUTO: 3.5 M/UL (ref 4–5.4)
SARS-COV-2 RDRP RESP QL NAA+PROBE: NEGATIVE
SODIUM SERPL-SCNC: 135 MMOL/L (ref 136–145)
TROPONIN I SERPL DL<=0.01 NG/ML-MCNC: 0.01 NG/ML (ref 0–0.03)
TROPONIN I SERPL DL<=0.01 NG/ML-MCNC: 0.02 NG/ML (ref 0–0.03)
TROPONIN I SERPL DL<=0.01 NG/ML-MCNC: 0.03 NG/ML (ref 0–0.03)
WBC # BLD AUTO: 6.66 K/UL (ref 3.9–12.7)

## 2022-10-07 PROCEDURE — 86920 COMPATIBILITY TEST SPIN: CPT | Performed by: NURSE PRACTITIONER

## 2022-10-07 PROCEDURE — 83880 ASSAY OF NATRIURETIC PEPTIDE: CPT | Performed by: REGISTERED NURSE

## 2022-10-07 PROCEDURE — 25000003 PHARM REV CODE 250: Performed by: NURSE PRACTITIONER

## 2022-10-07 PROCEDURE — 82272 OCCULT BLD FECES 1-3 TESTS: CPT | Performed by: EMERGENCY MEDICINE

## 2022-10-07 PROCEDURE — 93010 ELECTROCARDIOGRAM REPORT: CPT | Mod: ,,, | Performed by: INTERNAL MEDICINE

## 2022-10-07 PROCEDURE — 25000003 PHARM REV CODE 250: Performed by: EMERGENCY MEDICINE

## 2022-10-07 PROCEDURE — 36415 COLL VENOUS BLD VENIPUNCTURE: CPT | Performed by: EMERGENCY MEDICINE

## 2022-10-07 PROCEDURE — 85025 COMPLETE CBC W/AUTO DIFF WBC: CPT | Performed by: REGISTERED NURSE

## 2022-10-07 PROCEDURE — 84484 ASSAY OF TROPONIN QUANT: CPT | Mod: 91 | Performed by: NURSE PRACTITIONER

## 2022-10-07 PROCEDURE — 25000003 PHARM REV CODE 250: Performed by: REGISTERED NURSE

## 2022-10-07 PROCEDURE — 99285 EMERGENCY DEPT VISIT HI MDM: CPT | Mod: 25

## 2022-10-07 PROCEDURE — U0002 COVID-19 LAB TEST NON-CDC: HCPCS | Performed by: EMERGENCY MEDICINE

## 2022-10-07 PROCEDURE — 36415 COLL VENOUS BLD VENIPUNCTURE: CPT | Performed by: NURSE PRACTITIONER

## 2022-10-07 PROCEDURE — 96375 TX/PRO/DX INJ NEW DRUG ADDON: CPT

## 2022-10-07 PROCEDURE — 80053 COMPREHEN METABOLIC PANEL: CPT | Performed by: REGISTERED NURSE

## 2022-10-07 PROCEDURE — 93005 ELECTROCARDIOGRAM TRACING: CPT

## 2022-10-07 PROCEDURE — 63600175 PHARM REV CODE 636 W HCPCS: Performed by: NURSE PRACTITIONER

## 2022-10-07 PROCEDURE — 96374 THER/PROPH/DIAG INJ IV PUSH: CPT

## 2022-10-07 PROCEDURE — 83690 ASSAY OF LIPASE: CPT | Performed by: REGISTERED NURSE

## 2022-10-07 PROCEDURE — 93010 EKG 12-LEAD: ICD-10-PCS | Mod: ,,, | Performed by: INTERNAL MEDICINE

## 2022-10-07 PROCEDURE — 84484 ASSAY OF TROPONIN QUANT: CPT | Performed by: REGISTERED NURSE

## 2022-10-07 PROCEDURE — G0378 HOSPITAL OBSERVATION PER HR: HCPCS

## 2022-10-07 PROCEDURE — 96361 HYDRATE IV INFUSION ADD-ON: CPT

## 2022-10-07 PROCEDURE — C9113 INJ PANTOPRAZOLE SODIUM, VIA: HCPCS | Performed by: EMERGENCY MEDICINE

## 2022-10-07 PROCEDURE — 63600175 PHARM REV CODE 636 W HCPCS: Performed by: EMERGENCY MEDICINE

## 2022-10-07 PROCEDURE — 96376 TX/PRO/DX INJ SAME DRUG ADON: CPT

## 2022-10-07 PROCEDURE — 86850 RBC ANTIBODY SCREEN: CPT | Performed by: EMERGENCY MEDICINE

## 2022-10-07 RX ORDER — IPRATROPIUM BROMIDE AND ALBUTEROL SULFATE 2.5; .5 MG/3ML; MG/3ML
3 SOLUTION RESPIRATORY (INHALATION) EVERY 6 HOURS PRN
Status: DISCONTINUED | OUTPATIENT
Start: 2022-10-07 | End: 2022-10-09 | Stop reason: HOSPADM

## 2022-10-07 RX ORDER — SODIUM,POTASSIUM PHOSPHATES 280-250MG
2 POWDER IN PACKET (EA) ORAL
Status: DISCONTINUED | OUTPATIENT
Start: 2022-10-07 | End: 2022-10-09 | Stop reason: HOSPADM

## 2022-10-07 RX ORDER — MAG HYDROX/ALUMINUM HYD/SIMETH 200-200-20
30 SUSPENSION, ORAL (FINAL DOSE FORM) ORAL 4 TIMES DAILY PRN
Status: DISCONTINUED | OUTPATIENT
Start: 2022-10-07 | End: 2022-10-09 | Stop reason: HOSPADM

## 2022-10-07 RX ORDER — SODIUM CHLORIDE 0.9 % (FLUSH) 0.9 %
10 SYRINGE (ML) INJECTION
Status: DISCONTINUED | OUTPATIENT
Start: 2022-10-07 | End: 2022-10-09 | Stop reason: HOSPADM

## 2022-10-07 RX ORDER — ACETAMINOPHEN 325 MG/1
650 TABLET ORAL EVERY 4 HOURS PRN
Status: DISCONTINUED | OUTPATIENT
Start: 2022-10-07 | End: 2022-10-07

## 2022-10-07 RX ORDER — KETOROLAC TROMETHAMINE 30 MG/ML
15 INJECTION, SOLUTION INTRAMUSCULAR; INTRAVENOUS EVERY 6 HOURS PRN
Status: DISCONTINUED | OUTPATIENT
Start: 2022-10-07 | End: 2022-10-09 | Stop reason: HOSPADM

## 2022-10-07 RX ORDER — METOPROLOL TARTRATE 1 MG/ML
5 INJECTION, SOLUTION INTRAVENOUS
Status: COMPLETED | OUTPATIENT
Start: 2022-10-07 | End: 2022-10-07

## 2022-10-07 RX ORDER — SIMETHICONE 80 MG
1 TABLET,CHEWABLE ORAL 4 TIMES DAILY PRN
Status: DISCONTINUED | OUTPATIENT
Start: 2022-10-07 | End: 2022-10-09 | Stop reason: HOSPADM

## 2022-10-07 RX ORDER — PANTOPRAZOLE SODIUM 40 MG/10ML
40 INJECTION, POWDER, LYOPHILIZED, FOR SOLUTION INTRAVENOUS
Status: COMPLETED | OUTPATIENT
Start: 2022-10-07 | End: 2022-10-07

## 2022-10-07 RX ORDER — GLUCAGON 1 MG
1 KIT INJECTION
Status: DISCONTINUED | OUTPATIENT
Start: 2022-10-07 | End: 2022-10-09 | Stop reason: HOSPADM

## 2022-10-07 RX ORDER — IBUPROFEN 200 MG
16 TABLET ORAL
Status: DISCONTINUED | OUTPATIENT
Start: 2022-10-07 | End: 2022-10-09 | Stop reason: HOSPADM

## 2022-10-07 RX ORDER — CITALOPRAM 10 MG/1
10 TABLET ORAL DAILY
Status: DISCONTINUED | OUTPATIENT
Start: 2022-10-08 | End: 2022-10-09 | Stop reason: HOSPADM

## 2022-10-07 RX ORDER — PANTOPRAZOLE SODIUM 40 MG/10ML
40 INJECTION, POWDER, LYOPHILIZED, FOR SOLUTION INTRAVENOUS 2 TIMES DAILY
Status: DISCONTINUED | OUTPATIENT
Start: 2022-10-08 | End: 2022-10-09 | Stop reason: HOSPADM

## 2022-10-07 RX ORDER — LANOLIN ALCOHOL/MO/W.PET/CERES
800 CREAM (GRAM) TOPICAL
Status: DISCONTINUED | OUTPATIENT
Start: 2022-10-07 | End: 2022-10-09 | Stop reason: HOSPADM

## 2022-10-07 RX ORDER — GABAPENTIN 100 MG/1
100 CAPSULE ORAL 3 TIMES DAILY
COMMUNITY
Start: 2022-08-16

## 2022-10-07 RX ORDER — DOXEPIN HYDROCHLORIDE 25 MG/1
25 CAPSULE ORAL NIGHTLY
Status: DISCONTINUED | OUTPATIENT
Start: 2022-10-07 | End: 2022-10-09 | Stop reason: HOSPADM

## 2022-10-07 RX ORDER — ACETAMINOPHEN 325 MG/1
650 TABLET ORAL EVERY 8 HOURS PRN
Status: DISCONTINUED | OUTPATIENT
Start: 2022-10-07 | End: 2022-10-09 | Stop reason: HOSPADM

## 2022-10-07 RX ORDER — PROMETHAZINE HYDROCHLORIDE 12.5 MG/1
25 TABLET ORAL EVERY 6 HOURS PRN
Status: DISCONTINUED | OUTPATIENT
Start: 2022-10-07 | End: 2022-10-09 | Stop reason: HOSPADM

## 2022-10-07 RX ORDER — LORAZEPAM 0.5 MG/1
0.5 TABLET ORAL EVERY 6 HOURS PRN
Status: DISCONTINUED | OUTPATIENT
Start: 2022-10-07 | End: 2022-10-09 | Stop reason: HOSPADM

## 2022-10-07 RX ORDER — ONDANSETRON 8 MG/1
8 TABLET, ORALLY DISINTEGRATING ORAL EVERY 8 HOURS PRN
Status: DISCONTINUED | OUTPATIENT
Start: 2022-10-07 | End: 2022-10-09 | Stop reason: HOSPADM

## 2022-10-07 RX ORDER — DIPHENHYDRAMINE HCL 25 MG
25 CAPSULE ORAL EVERY 6 HOURS PRN
Status: DISCONTINUED | OUTPATIENT
Start: 2022-10-07 | End: 2022-10-09 | Stop reason: HOSPADM

## 2022-10-07 RX ORDER — SODIUM CHLORIDE 0.9 % (FLUSH) 0.9 %
10 SYRINGE (ML) INJECTION EVERY 8 HOURS
Status: DISCONTINUED | OUTPATIENT
Start: 2022-10-07 | End: 2022-10-09 | Stop reason: HOSPADM

## 2022-10-07 RX ORDER — TALC
6 POWDER (GRAM) TOPICAL NIGHTLY PRN
Status: DISCONTINUED | OUTPATIENT
Start: 2022-10-07 | End: 2022-10-09 | Stop reason: HOSPADM

## 2022-10-07 RX ORDER — MORPHINE SULFATE 4 MG/ML
4 INJECTION, SOLUTION INTRAMUSCULAR; INTRAVENOUS EVERY 4 HOURS PRN
Status: DISCONTINUED | OUTPATIENT
Start: 2022-10-07 | End: 2022-10-09 | Stop reason: HOSPADM

## 2022-10-07 RX ORDER — IBUPROFEN 200 MG
24 TABLET ORAL
Status: DISCONTINUED | OUTPATIENT
Start: 2022-10-07 | End: 2022-10-09 | Stop reason: HOSPADM

## 2022-10-07 RX ORDER — GABAPENTIN 100 MG/1
100 CAPSULE ORAL 3 TIMES DAILY
Status: DISCONTINUED | OUTPATIENT
Start: 2022-10-07 | End: 2022-10-09 | Stop reason: HOSPADM

## 2022-10-07 RX ORDER — MORPHINE SULFATE 4 MG/ML
4 INJECTION, SOLUTION INTRAMUSCULAR; INTRAVENOUS
Status: COMPLETED | OUTPATIENT
Start: 2022-10-07 | End: 2022-10-07

## 2022-10-07 RX ORDER — ONDANSETRON 2 MG/ML
4 INJECTION INTRAMUSCULAR; INTRAVENOUS
Status: COMPLETED | OUTPATIENT
Start: 2022-10-07 | End: 2022-10-07

## 2022-10-07 RX ORDER — NALOXONE HCL 0.4 MG/ML
0.02 VIAL (ML) INJECTION
Status: DISCONTINUED | OUTPATIENT
Start: 2022-10-07 | End: 2022-10-09 | Stop reason: HOSPADM

## 2022-10-07 RX ADMIN — DOXEPIN HYDROCHLORIDE 25 MG: 25 CAPSULE ORAL at 08:10

## 2022-10-07 RX ADMIN — PROMETHAZINE HYDROCHLORIDE 25 MG: 12.5 TABLET ORAL at 08:10

## 2022-10-07 RX ADMIN — GABAPENTIN 100 MG: 100 CAPSULE ORAL at 08:10

## 2022-10-07 RX ADMIN — MORPHINE SULFATE 4 MG: 4 INJECTION INTRAVENOUS at 07:10

## 2022-10-07 RX ADMIN — ONDANSETRON 8 MG: 8 TABLET, ORALLY DISINTEGRATING ORAL at 07:10

## 2022-10-07 RX ADMIN — PANTOPRAZOLE SODIUM 40 MG: 40 INJECTION, POWDER, FOR SOLUTION INTRAVENOUS at 05:10

## 2022-10-07 RX ADMIN — METOROPROLOL TARTRATE 5 MG: 5 INJECTION, SOLUTION INTRAVENOUS at 03:10

## 2022-10-07 RX ADMIN — MORPHINE SULFATE 4 MG: 4 INJECTION INTRAVENOUS at 03:10

## 2022-10-07 RX ADMIN — ONDANSETRON 4 MG: 2 INJECTION INTRAMUSCULAR; INTRAVENOUS at 03:10

## 2022-10-07 RX ADMIN — SODIUM CHLORIDE 1000 ML: 9 INJECTION, SOLUTION INTRAVENOUS at 03:10
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
## 2022-10-07 NOTE — ASSESSMENT & PLAN NOTE
--hgb 10.0, baseline 10.2  --pt reports dark stool and regular ibuprofen use  --GI bleed pathway initiated  --GI consulted by ED  --Trend CBC  --type & screen - transfuse if hgb falls below 8

## 2022-10-07 NOTE — HPI
51 y/o female with PMHx HTN, COPD, obesity, and GERD who presented to the ED with c/o abd pain that onset gradually over the past week. Pain is epigastric, sharp, a 10/10, and does not migrate. Sx are constant and moderate in severity. No exacerbating factors reported. Mild relief with vomiting. Associated sx include SOB, dizziness, diaphoresis, nausea, vomiting, melena, and decreased appetite. Pt denies fever, chills, cough, CP, dysuria, and all other sx at this time.

## 2022-10-07 NOTE — Clinical Note
Diagnosis: Shortness of breath [786.05.ICD-9-CM]   Future Attending Provider: GREGORY SPENCE [[id number]]   Admitting Provider:: GREGORY SPENCE [[id number]]

## 2022-10-07 NOTE — ASSESSMENT & PLAN NOTE
--Hgb 10.0 - baseline  --trend hgb  --GI consulted by ED  --GI bleed pathway initiated  --trend CBC  --transfuse if hgb falls below 8  --IV protonix
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
## 2022-10-07 NOTE — H&P
[facility]  [facility] Medicine  History & Physical    Patient Name: Morenita White  MRN: [id number]  Patient Class: OP- Observation  Admission Date: 10/7/2022  Attending Physician: Catalina Ricks, *   Primary Care Provider: Benitez Harris NP         Patient information was obtained from patient, past medical records and ER records.     Subjective:     Principal Problem:Elevated troponin    Chief Complaint:   Chief Complaint   Patient presents with    Dizziness     Pt c/o dizziness and fever since Monday. Denies chest pain. Also having N/V/D.         HPI: 53 y/o female with PMHx HTN, COPD, obesity, and GERD who presented to the ED with c/o abd pain that onset gradually over the past week. Pain is epigastric, sharp, a 10/10, and does not migrate. Sx are constant and moderate in severity. No exacerbating factors reported. Mild relief with vomiting. Associated sx include SOB, dizziness, diaphoresis, nausea, vomiting, melena, and decreased appetite. Pt denies fever, chills, cough, CP, dysuria, and all other sx at this time.  ED work up shows: H/H 10.0/29.0, Na 135, K+ 3.2, BUN 36, eGFR 55, glucose 170, lipase 83, troponin 0.033, + occult blood  She is a full code and her SDM is her nephew, Goyo  She will be kept on OBS for abd pain under the care of hospital medicine.      Past Medical History:   Diagnosis Date    Anxiety disorder, unspecified     COPD (chronic obstructive pulmonary disease)     Depression     Depression     GERD (gastroesophageal reflux disease)     Hypertension     Normocytic anemia        Past Surgical History:   Procedure Laterality Date    FRACTURE SURGERY      Right knee reconstruction from [facility]       Review of patient's allergies indicates:  No Known Allergies    No current facility-administered medications on file prior to encounter.     Current Outpatient Medications on File Prior to Encounter   Medication Sig    citalopram (CELEXA) 10 MG tablet Take 10 mg by  mouth once daily.    doxepin (SINEQUAN) 25 MG capsule Take 25 mg by mouth every evening.    gabapentin (NEURONTIN) 100 MG capsule Take 100 mg by mouth 3 (three) times daily.    ibuprofen (ADVIL,MOTRIN) 800 MG tablet Take 1 tablet (800 mg total) by mouth every 6 (six) hours as needed for Pain.    MIRTAZAPINE (REMERON ORAL) Take by mouth.    traMADoL (ULTRAM) 50 mg tablet Take 1 tablet (50 mg total) by mouth every 6 (six) hours as needed for Pain.    albuterol-ipratropium 2.5mg-0.5mg/3mL (DUO-NEB) 0.5 mg-3 mg(2.5 mg base)/3 mL nebulizer solution Take 3 mLs by nebulization every 6 (six) hours as needed for Wheezing. Rescue    metoclopramide HCl (REGLAN) 10 MG tablet Take 1 tablet (10 mg total) by mouth every 6 (six) hours as needed. Prn headache or nausea    metroNIDAZOLE (METROGEL) 0.75 % vaginal gel Place 1 applicator vaginally 2 (two) times daily.    ondansetron (ZOFRAN) 4 MG tablet Take 1 tablet (4 mg total) by mouth every 8 (eight) hours as needed.     Family History    Reviewed and non contributory       Tobacco Use    Smoking status: Every Day     Packs/day: 1.00     Types: Cigarettes    Smokeless tobacco: Never   Substance and Sexual Activity    Alcohol use: No    Drug use: No    Sexual activity: Yes     Partners: Male     Review of Systems   Constitutional:  Positive for diaphoresis. Negative for activity change, appetite change, chills, fatigue and fever.   HENT:  Negative for congestion, dental problem, ear pain, hearing loss, mouth sores, sinus pressure, sore throat, tinnitus and trouble swallowing.    Eyes:  Negative for pain, discharge, redness and visual disturbance.   Respiratory:  Positive for shortness of breath. Negative for apnea, cough, choking, chest tightness and wheezing.    Cardiovascular:  Negative for chest pain, palpitations and leg swelling.   Gastrointestinal:  Positive for abdominal distention, nausea and vomiting. Negative for abdominal pain, anal bleeding, blood in stool,  constipation, diarrhea and rectal pain.   Endocrine: Negative for cold intolerance, heat intolerance, polydipsia and polyuria.   Genitourinary:  Negative for difficulty urinating, dysuria, flank pain, frequency, hematuria and urgency.   Musculoskeletal:  Negative for arthralgias, back pain, gait problem, joint swelling, myalgias, neck pain and neck stiffness.   Skin:  Negative for color change, rash and wound.   Allergic/Immunologic: Negative.    Neurological:  Negative for dizziness, tremors, seizures, syncope, speech difficulty, light-headedness and headaches.   Hematological: Negative.    Psychiatric/Behavioral:  Negative for agitation, behavioral problems, confusion and sleep disturbance. The patient is not nervous/anxious.    All other systems reviewed and are negative.  Objective:     Vital Signs (Most Recent):  Temp: 98.3 °F (36.8 °C) (10/07/22 1416)  Pulse: 91 (10/07/22 1632)  Resp: 20 (10/07/22 1632)  BP: (!) 157/73 (10/07/22 1632)  SpO2: 99 % (10/07/22 1632)   Vital Signs (24h Range):  Temp:  [98.3 °F (36.8 °C)] 98.3 °F (36.8 °C)  Pulse:  [] 91  Resp:  [16-24] 20  SpO2:  [98 %-99 %] 99 %  BP: (155-199)/(73-78) 157/73     Weight: 70.8 kg (156 lb 3.1 oz)  Body mass index is 26.81 kg/m².    Physical Exam  Vitals and nursing note reviewed.   Constitutional:       General: She is not in acute distress.     Appearance: She is well-developed. She is not diaphoretic.   HENT:      Head: Normocephalic and atraumatic.      Nose: Nose normal.   Eyes:      General: No scleral icterus.     Extraocular Movements: Extraocular movements intact.      Pupils: Pupils are equal, round, and reactive to light.   Neck:      Thyroid: No thyromegaly.      Vascular: No JVD.      Trachea: No tracheal deviation.   Cardiovascular:      Rate and Rhythm: Normal rate and regular rhythm.      Heart sounds: Normal heart sounds. No murmur heard.    No friction rub. No gallop.   Pulmonary:      Effort: Pulmonary effort is normal. No  respiratory distress.      Breath sounds: Normal breath sounds. No wheezing or rales.   Chest:      Chest wall: No tenderness.   Abdominal:      General: Bowel sounds are normal. There is no distension.      Palpations: Abdomen is soft. There is no mass.      Tenderness: There is abdominal tenderness.   Genitourinary:     Comments: deferred  Musculoskeletal:         General: No tenderness or deformity. Normal range of motion.      Cervical back: Normal range of motion and neck supple.   Skin:     General: Skin is warm and dry.      Capillary Refill: Capillary refill takes less than 2 seconds.      Coloration: Skin is not pale.      Findings: No erythema or rash.   Neurological:      Mental Status: She is alert and oriented to person, place, and time. Mental status is at baseline.      Cranial Nerves: No cranial nerve deficit.      Motor: No abnormal muscle tone.      Coordination: Coordination normal.   Psychiatric:         Mood and Affect: Mood normal.         Behavior: Behavior normal.         CRANIAL NERVES     CN III, IV, VI   Pupils are equal, round, and reactive to light.     Significant Labs: All pertinent labs within the past 24 hours have been reviewed.  Recent Lab Results         10/07/22  1538   10/07/22  1532   10/07/22  1521   10/07/22  1505        Albumin       3.8       Alkaline Phosphatase       86       ALT       19       Anion Gap       14       AST       31       Baso #       0.02       Basophil %       0.3       BILIRUBIN TOTAL       0.6  Comment: For infants and newborns, interpretation of results should be based  on gestational age, weight and in agreement with clinical  observations.    Premature Infant recommended reference ranges:  Up to 24 hours.............<8.0 mg/dL  Up to 48 hours............<12.0 mg/dL  3-5 days..................<15.0 mg/dL  6-29 days.................<15.0 mg/dL         BNP       37  Comment: Values of less than 100 pg/ml are consistent with non-CHF populations.        BUN       36       Calcium       10.3       Chloride       102       CO2       19       Creatinine       1.2       Differential Method       Automated       eGFR       55       Eos #       0.0       Eosinophil %       0.2       Glucose       170       Gran # (ANC)       5.0       Gran %       74.2       Group & Rh O POS             Hematocrit       29.0       Hemoglobin       10.0       Immature Grans (Abs)       0.03  Comment: Mild elevation in immature granulocytes is non specific and   can be seen in a variety of conditions including stress response,   acute inflammation, trauma and pregnancy. Correlation with other   laboratory and clinical findings is essential.         Immature Granulocytes       0.5       INDIRECT TIFFANIE NEG             Lipase       83       Lymph #       1.1       Lymph %       15.8       MCH       28.6       MCHC       34.5       MCV       83       Mono #       0.6       Mono %       9.0       MPV       9.7       nRBC       0       Occult Blood     Positive         Platelets       246       Potassium       3.2       PROTEIN TOTAL       7.6       RBC       3.50       RDW       15.0       SARS-CoV-2 RNA, Amplification, Qual   Negative  Comment: This test utilizes isothermal nucleic acid amplification   technology to detect the SARS-CoV-2 RdRp nucleic acid segment.   The analytical sensitivity (limit of detection) is 125 genome   equivalents/mL.     A POSITIVE result implies infection with the SARS-CoV-2 virus;  the patient is presumed to be contagious.    A NEGATIVE result means that SARS-CoV-2 nucleic acids are not  present above the limit of detection. A NEGATIVE result should be   treated as presumptive. It does not rule out the possibility of   COVID-19 and should not be the sole basis for treatment decisions.   If COVID-19 is strongly suspected based on clinical and exposure   history, re-testing using an alternate molecular assay should be   considered.       This test is only for use  under the Food and Drug   Administration s Emergency Use Authorization (EUA).   Commercial kits are provided by [employer].   Performance characteristics of the EUA have been independently  verified by [facility] Department of  Pathology and Laboratory Medicine.   _________________________________________________________________  The ID NOW COVID-19 Letter of Authorization, along with the   authorized Fact Sheet for Healthcare Providers, the authorized Fact  Sheet for Patients, and authorized labeling are available on the FDA   website:  www.fda.gov/MedicalDevices/Safety/EmergencySituations/kgz128306.htm             Sodium       135       Troponin I       0.033  Comment: The reference interval for Troponin I represents the 99th percentile   cutoff   for our facility and is consistent with 3rd generation assay   performance.         WBC       6.66               Significant Imaging: I have reviewed all pertinent imaging results/findings within the past 24 hours.    Assessment/Plan:     * Elevated troponin  --initial Troponin 0.033  --pt reports SOB, diaphoresis, dizziness, N/V  --/78  --trend out troponin  --cardiology consulted  --ASA, nitro paste      Normocytic anemia  --hgb 10.0, baseline 10.2  --pt reports dark stool and regular ibuprofen use  --GI bleed pathway initiated  --GI consulted by ED  --Trend CBC  --type & screen - transfuse if hgb falls below 8      Depression  Patient has persistent depression which is mild and is currently controlled. Will Continue anti-depressant medications. We will not consult psychiatry at this time. Patient does not display psychosis at this time. Continue to monitor closely and adjust plan of care as needed.        Anxiety  --PRN ativan  --supportive care      Melena  --Hgb 10.0 - baseline  --trend hgb  --GI consulted by ED  --GI bleed pathway initiated  --trend CBC  --transfuse if hgb falls below 8  --IV protonix        VTE Risk Mitigation (From  admission, onward)         Ordered     IP VTE LOW RISK PATIENT  Once         10/07/22 1818     Place sequential compression device  Until discontinued         10/07/22 1818                   CANDIE Smith-LEVY  Department of Hospital Medicine   [facility] hospital - Emergency Dept.

## 2022-10-07 NOTE — FIRST PROVIDER EVALUATION
Medical screening examination initiated.  I have conducted a focused provider triage encounter, findings are as follows:    Brief history of present illness:  SOB, weakness and dark stools, Symptoms began 4 days ago.    There were no vitals filed for this visit.    Pertinent physical exam:  Tachycardic in triage, NAD    Brief workup plan:  Workup    Preliminary workup initiated; this workup will be continued and followed by the physician or advanced practice provider that is assigned to the patient when roomed.

## 2022-10-07 NOTE — ASSESSMENT & PLAN NOTE
--initial Troponin 0.033  --pt reports SOB, diaphoresis, dizziness, N/V  --/78  --trend out troponin  --cardiology consulted  --ASA, nitro paste

## 2022-10-08 ENCOUNTER — ANESTHESIA (OUTPATIENT)
Dept: ENDOSCOPY | Facility: HOSPITAL | Age: 52
End: 2022-10-08
Payer: MEDICAID

## 2022-10-08 ENCOUNTER — ANESTHESIA EVENT (OUTPATIENT)
Dept: ENDOSCOPY | Facility: HOSPITAL | Age: 52
End: 2022-10-08
Payer: MEDICAID

## 2022-10-08 LAB
ALBUMIN SERPL BCP-MCNC: 3.3 G/DL (ref 3.5–5.2)
ALP SERPL-CCNC: 65 U/L (ref 55–135)
ALT SERPL W/O P-5'-P-CCNC: 16 U/L (ref 10–44)
ANION GAP SERPL CALC-SCNC: 11 MMOL/L (ref 8–16)
ANION GAP SERPL CALC-SCNC: 13 MMOL/L (ref 8–16)
AORTIC ROOT ANNULUS: 3.17 CM
ASCENDING AORTA: 2.65 CM
AST SERPL-CCNC: 25 U/L (ref 10–40)
AV INDEX (PROSTH): 0.52
AV MEAN GRADIENT: 12 MMHG
AV PEAK GRADIENT: 22 MMHG
AV REGURGITATION PRESSURE HALF TIME: 344.15 MS
AV VALVE AREA: 1.53 CM2
AV VELOCITY RATIO: 0.51
BASOPHILS # BLD AUTO: 0.02 K/UL (ref 0–0.2)
BASOPHILS # BLD AUTO: 0.02 K/UL (ref 0–0.2)
BASOPHILS # BLD AUTO: 0.03 K/UL (ref 0–0.2)
BASOPHILS NFR BLD: 0.5 % (ref 0–1.9)
BASOPHILS NFR BLD: 0.6 % (ref 0–1.9)
BASOPHILS NFR BLD: 0.6 % (ref 0–1.9)
BILIRUB SERPL-MCNC: 0.4 MG/DL (ref 0.1–1)
BLD PROD TYP BPU: NORMAL
BLOOD UNIT EXPIRATION DATE: NORMAL
BLOOD UNIT TYPE CODE: 5100
BLOOD UNIT TYPE: NORMAL
BSA FOR ECHO PROCEDURE: 1.82 M2
BUN SERPL-MCNC: 19 MG/DL (ref 6–20)
BUN SERPL-MCNC: 26 MG/DL (ref 6–20)
CALCIUM SERPL-MCNC: 8.6 MG/DL (ref 8.7–10.5)
CALCIUM SERPL-MCNC: 8.7 MG/DL (ref 8.7–10.5)
CHLORIDE SERPL-SCNC: 103 MMOL/L (ref 95–110)
CHLORIDE SERPL-SCNC: 106 MMOL/L (ref 95–110)
CO2 SERPL-SCNC: 18 MMOL/L (ref 23–29)
CO2 SERPL-SCNC: 21 MMOL/L (ref 23–29)
CODING SYSTEM: NORMAL
CREAT SERPL-MCNC: 0.7 MG/DL (ref 0.5–1.4)
CREAT SERPL-MCNC: 0.8 MG/DL (ref 0.5–1.4)
CV ECHO LV RWT: 0.76 CM
DIFFERENTIAL METHOD: ABNORMAL
DISPENSE STATUS: NORMAL
DOP CALC AO PEAK VEL: 2.37 M/S
DOP CALC AO VTI: 45.4 CM
DOP CALC LVOT AREA: 2.9 CM2
DOP CALC LVOT DIAMETER: 1.93 CM
DOP CALC LVOT PEAK VEL: 1.22 M/S
DOP CALC LVOT STROKE VOLUME: 69.59 CM3
DOP CALC RVOT PEAK VEL: 0.7 M/S
DOP CALC RVOT VTI: 11.9 CM
DOP CALCLVOT PEAK VEL VTI: 23.8 CM
E WAVE DECELERATION TIME: 311.86 MSEC
E/A RATIO: 0.69
E/E' RATIO: 12.29 M/S
ECHO LV POSTERIOR WALL: 1.54 CM (ref 0.6–1.1)
EJECTION FRACTION: 55 %
EOSINOPHIL # BLD AUTO: 0.1 K/UL (ref 0–0.5)
EOSINOPHIL NFR BLD: 1.3 % (ref 0–8)
EOSINOPHIL NFR BLD: 2.5 % (ref 0–8)
EOSINOPHIL NFR BLD: 3.1 % (ref 0–8)
ERYTHROCYTE [DISTWIDTH] IN BLOOD BY AUTOMATED COUNT: 15.6 % (ref 11.5–14.5)
ERYTHROCYTE [DISTWIDTH] IN BLOOD BY AUTOMATED COUNT: 15.8 % (ref 11.5–14.5)
ERYTHROCYTE [DISTWIDTH] IN BLOOD BY AUTOMATED COUNT: 15.8 % (ref 11.5–14.5)
EST. GFR  (NO RACE VARIABLE): >60 ML/MIN/1.73 M^2
EST. GFR  (NO RACE VARIABLE): >60 ML/MIN/1.73 M^2
FRACTIONAL SHORTENING: 25 % (ref 28–44)
GLUCOSE SERPL-MCNC: 103 MG/DL (ref 70–110)
GLUCOSE SERPL-MCNC: 108 MG/DL (ref 70–110)
HCT VFR BLD AUTO: 23.3 % (ref 37–48.5)
HCT VFR BLD AUTO: 25.3 % (ref 37–48.5)
HCT VFR BLD AUTO: 30.1 % (ref 37–48.5)
HGB BLD-MCNC: 10.1 G/DL (ref 12–16)
HGB BLD-MCNC: 7.7 G/DL (ref 12–16)
HGB BLD-MCNC: 8.2 G/DL (ref 12–16)
IMM GRANULOCYTES # BLD AUTO: 0.01 K/UL (ref 0–0.04)
IMM GRANULOCYTES # BLD AUTO: 0.01 K/UL (ref 0–0.04)
IMM GRANULOCYTES # BLD AUTO: 0.02 K/UL (ref 0–0.04)
IMM GRANULOCYTES NFR BLD AUTO: 0.3 % (ref 0–0.5)
IMM GRANULOCYTES NFR BLD AUTO: 0.3 % (ref 0–0.5)
IMM GRANULOCYTES NFR BLD AUTO: 0.4 % (ref 0–0.5)
INTERVENTRICULAR SEPTUM: 1.84 CM (ref 0.6–1.1)
IVC DIAMETER: 1.25 CM
IVRT: 79.92 MSEC
LA MAJOR: 4.77 CM
LA MINOR: 4.82 CM
LA WIDTH: 3.8 CM
LEFT ATRIUM SIZE: 3.99 CM
LEFT ATRIUM VOLUME INDEX: 34.7 ML/M2
LEFT ATRIUM VOLUME: 61.79 CM3
LEFT INTERNAL DIMENSION IN SYSTOLE: 3.02 CM (ref 2.1–4)
LEFT VENTRICLE DIASTOLIC VOLUME INDEX: 40.27 ML/M2
LEFT VENTRICLE DIASTOLIC VOLUME: 71.68 ML
LEFT VENTRICLE MASS INDEX: 160 G/M2
LEFT VENTRICLE SYSTOLIC VOLUME INDEX: 20 ML/M2
LEFT VENTRICLE SYSTOLIC VOLUME: 35.54 ML
LEFT VENTRICULAR INTERNAL DIMENSION IN DIASTOLE: 4.04 CM (ref 3.5–6)
LEFT VENTRICULAR MASS: 285.63 G
LIPASE SERPL-CCNC: 42 U/L (ref 4–60)
LV LATERAL E/E' RATIO: 12.29 M/S
LV SEPTAL E/E' RATIO: 12.29 M/S
LVOT MG: 3.39 MMHG
LVOT MV: 0.88 CM/S
LYMPHOCYTES # BLD AUTO: 1.2 K/UL (ref 1–4.8)
LYMPHOCYTES # BLD AUTO: 1.3 K/UL (ref 1–4.8)
LYMPHOCYTES # BLD AUTO: 1.4 K/UL (ref 1–4.8)
LYMPHOCYTES NFR BLD: 29.5 % (ref 18–48)
LYMPHOCYTES NFR BLD: 31.3 % (ref 18–48)
LYMPHOCYTES NFR BLD: 35.8 % (ref 18–48)
MAGNESIUM SERPL-MCNC: 1.6 MG/DL (ref 1.6–2.6)
MCH RBC QN AUTO: 28.4 PG (ref 27–31)
MCH RBC QN AUTO: 28.5 PG (ref 27–31)
MCH RBC QN AUTO: 29.3 PG (ref 27–31)
MCHC RBC AUTO-ENTMCNC: 32.4 G/DL (ref 32–36)
MCHC RBC AUTO-ENTMCNC: 33 G/DL (ref 32–36)
MCHC RBC AUTO-ENTMCNC: 33.6 G/DL (ref 32–36)
MCV RBC AUTO: 86 FL (ref 82–98)
MCV RBC AUTO: 87 FL (ref 82–98)
MCV RBC AUTO: 88 FL (ref 82–98)
MONOCYTES # BLD AUTO: 0.4 K/UL (ref 0.3–1)
MONOCYTES # BLD AUTO: 0.4 K/UL (ref 0.3–1)
MONOCYTES # BLD AUTO: 0.5 K/UL (ref 0.3–1)
MONOCYTES NFR BLD: 11.1 % (ref 4–15)
MONOCYTES NFR BLD: 12.1 % (ref 4–15)
MONOCYTES NFR BLD: 7.9 % (ref 4–15)
MV PEAK A VEL: 1.25 M/S
MV PEAK E VEL: 0.86 M/S
MV STENOSIS PRESSURE HALF TIME: 90.44 MS
MV VALVE AREA P 1/2 METHOD: 2.43 CM2
NEUTROPHILS # BLD AUTO: 1.8 K/UL (ref 1.8–7.7)
NEUTROPHILS # BLD AUTO: 2.1 K/UL (ref 1.8–7.7)
NEUTROPHILS # BLD AUTO: 2.9 K/UL (ref 1.8–7.7)
NEUTROPHILS NFR BLD: 49.1 % (ref 38–73)
NEUTROPHILS NFR BLD: 54.5 % (ref 38–73)
NEUTROPHILS NFR BLD: 59.1 % (ref 38–73)
NRBC BLD-RTO: 0 /100 WBC
NUM UNITS TRANS PACKED RBC: NORMAL
PHOSPHATE SERPL-MCNC: 3.1 MG/DL (ref 2.7–4.5)
PISA AR MAX VEL: 4.68 M/S
PISA MRMAX VEL: 5.22 M/S
PISA TR MAX VEL: 2.74 M/S
PLATELET # BLD AUTO: 175 K/UL (ref 150–450)
PLATELET # BLD AUTO: 178 K/UL (ref 150–450)
PLATELET # BLD AUTO: 238 K/UL (ref 150–450)
PMV BLD AUTO: 10.1 FL (ref 9.2–12.9)
PMV BLD AUTO: 10.3 FL (ref 9.2–12.9)
PMV BLD AUTO: 10.3 FL (ref 9.2–12.9)
POTASSIUM SERPL-SCNC: 3.1 MMOL/L (ref 3.5–5.1)
POTASSIUM SERPL-SCNC: 4.5 MMOL/L (ref 3.5–5.1)
PROT SERPL-MCNC: 6.5 G/DL (ref 6–8.4)
PULM VEIN S/D RATIO: 1.61
PV MEAN GRADIENT: 1.25 MMHG
PV PEAK D VEL: 0.38 M/S
PV PEAK S VEL: 0.61 M/S
RA MAJOR: 3.72 CM
RA PRESSURE: 3 MMHG
RA WIDTH: 2.5 CM
RBC # BLD AUTO: 2.7 M/UL (ref 4–5.4)
RBC # BLD AUTO: 2.89 M/UL (ref 4–5.4)
RBC # BLD AUTO: 3.45 M/UL (ref 4–5.4)
SINUS: 2.77 CM
SODIUM SERPL-SCNC: 134 MMOL/L (ref 136–145)
SODIUM SERPL-SCNC: 138 MMOL/L (ref 136–145)
STJ: 2.5 CM
TDI LATERAL: 0.07 M/S
TDI SEPTAL: 0.07 M/S
TDI: 0.07 M/S
TR MAX PG: 30 MMHG
TRICUSPID ANNULAR PLANE SYSTOLIC EXCURSION: 2.22 CM
TROPONIN I SERPL DL<=0.01 NG/ML-MCNC: 0.01 NG/ML (ref 0–0.03)
TV REST PULMONARY ARTERY PRESSURE: 33 MMHG
WBC # BLD AUTO: 3.6 K/UL (ref 3.9–12.7)
WBC # BLD AUTO: 3.8 K/UL (ref 3.9–12.7)
WBC # BLD AUTO: 4.82 K/UL (ref 3.9–12.7)

## 2022-10-08 PROCEDURE — 80048 BASIC METABOLIC PNL TOTAL CA: CPT | Mod: XB | Performed by: EMERGENCY MEDICINE

## 2022-10-08 PROCEDURE — 36430 TRANSFUSION BLD/BLD COMPNT: CPT

## 2022-10-08 PROCEDURE — 83690 ASSAY OF LIPASE: CPT | Performed by: NURSE PRACTITIONER

## 2022-10-08 PROCEDURE — 36415 COLL VENOUS BLD VENIPUNCTURE: CPT | Performed by: NURSE PRACTITIONER

## 2022-10-08 PROCEDURE — 84484 ASSAY OF TROPONIN QUANT: CPT | Performed by: NURSE PRACTITIONER

## 2022-10-08 PROCEDURE — 25000003 PHARM REV CODE 250: Performed by: NURSE PRACTITIONER

## 2022-10-08 PROCEDURE — 88305 TISSUE EXAM BY PATHOLOGIST: CPT | Mod: 26,,, | Performed by: STUDENT IN AN ORGANIZED HEALTH CARE EDUCATION/TRAINING PROGRAM

## 2022-10-08 PROCEDURE — 63600175 PHARM REV CODE 636 W HCPCS: Performed by: NURSE PRACTITIONER

## 2022-10-08 PROCEDURE — C9113 INJ PANTOPRAZOLE SODIUM, VIA: HCPCS | Performed by: NURSE PRACTITIONER

## 2022-10-08 PROCEDURE — 88342 IMHCHEM/IMCYTCHM 1ST ANTB: CPT | Mod: 26,,, | Performed by: STUDENT IN AN ORGANIZED HEALTH CARE EDUCATION/TRAINING PROGRAM

## 2022-10-08 PROCEDURE — 99203 PR OFFICE/OUTPT VISIT, NEW, LEVL III, 30-44 MIN: ICD-10-PCS | Mod: 25,,, | Performed by: INTERNAL MEDICINE

## 2022-10-08 PROCEDURE — 99204 OFFICE O/P NEW MOD 45 MIN: CPT | Mod: ,,, | Performed by: INTERNAL MEDICINE

## 2022-10-08 PROCEDURE — 36415 COLL VENOUS BLD VENIPUNCTURE: CPT | Performed by: EMERGENCY MEDICINE

## 2022-10-08 PROCEDURE — 96376 TX/PRO/DX INJ SAME DRUG ADON: CPT | Mod: 59

## 2022-10-08 PROCEDURE — 00731 ANES UPR GI NDSC PX NOS: CPT | Performed by: INTERNAL MEDICINE

## 2022-10-08 PROCEDURE — A4216 STERILE WATER/SALINE, 10 ML: HCPCS | Performed by: NURSE PRACTITIONER

## 2022-10-08 PROCEDURE — 80053 COMPREHEN METABOLIC PANEL: CPT | Performed by: NURSE PRACTITIONER

## 2022-10-08 PROCEDURE — 63600175 PHARM REV CODE 636 W HCPCS: Performed by: NURSE ANESTHETIST, CERTIFIED REGISTERED

## 2022-10-08 PROCEDURE — 43239 EGD BIOPSY SINGLE/MULTIPLE: CPT | Performed by: INTERNAL MEDICINE

## 2022-10-08 PROCEDURE — S5010 5% DEXTROSE AND 0.45% SALINE: HCPCS | Performed by: NURSE PRACTITIONER

## 2022-10-08 PROCEDURE — 88305 TISSUE EXAM BY PATHOLOGIST: ICD-10-PCS | Mod: 26,,, | Performed by: STUDENT IN AN ORGANIZED HEALTH CARE EDUCATION/TRAINING PROGRAM

## 2022-10-08 PROCEDURE — 43239 PR EGD, FLEX, W/BIOPSY, SGL/MULTI: ICD-10-PCS | Mod: ,,, | Performed by: INTERNAL MEDICINE

## 2022-10-08 PROCEDURE — 43239 EGD BIOPSY SINGLE/MULTIPLE: CPT | Mod: ,,, | Performed by: INTERNAL MEDICINE

## 2022-10-08 PROCEDURE — G0378 HOSPITAL OBSERVATION PER HR: HCPCS

## 2022-10-08 PROCEDURE — 27201012 HC FORCEPS, HOT/COLD, DISP: Performed by: INTERNAL MEDICINE

## 2022-10-08 PROCEDURE — 84100 ASSAY OF PHOSPHORUS: CPT | Performed by: NURSE PRACTITIONER

## 2022-10-08 PROCEDURE — 85025 COMPLETE CBC W/AUTO DIFF WBC: CPT | Mod: 91 | Performed by: NURSE PRACTITIONER

## 2022-10-08 PROCEDURE — 88305 TISSUE EXAM BY PATHOLOGIST: CPT | Performed by: STUDENT IN AN ORGANIZED HEALTH CARE EDUCATION/TRAINING PROGRAM

## 2022-10-08 PROCEDURE — 99204 PR OFFICE/OUTPT VISIT, NEW, LEVL IV, 45-59 MIN: ICD-10-PCS | Mod: ,,, | Performed by: INTERNAL MEDICINE

## 2022-10-08 PROCEDURE — 99203 OFFICE O/P NEW LOW 30 MIN: CPT | Mod: 25,,, | Performed by: INTERNAL MEDICINE

## 2022-10-08 PROCEDURE — 96361 HYDRATE IV INFUSION ADD-ON: CPT

## 2022-10-08 PROCEDURE — 25000003 PHARM REV CODE 250: Performed by: NURSE ANESTHETIST, CERTIFIED REGISTERED

## 2022-10-08 PROCEDURE — 88342 IMHCHEM/IMCYTCHM 1ST ANTB: CPT | Performed by: STUDENT IN AN ORGANIZED HEALTH CARE EDUCATION/TRAINING PROGRAM

## 2022-10-08 PROCEDURE — 83735 ASSAY OF MAGNESIUM: CPT | Performed by: NURSE PRACTITIONER

## 2022-10-08 PROCEDURE — P9016 RBC LEUKOCYTES REDUCED: HCPCS | Performed by: NURSE PRACTITIONER

## 2022-10-08 PROCEDURE — 88342 CHG IMMUNOCYTOCHEMISTRY: ICD-10-PCS | Mod: 26,,, | Performed by: STUDENT IN AN ORGANIZED HEALTH CARE EDUCATION/TRAINING PROGRAM

## 2022-10-08 PROCEDURE — 37000008 HC ANESTHESIA 1ST 15 MINUTES: Performed by: INTERNAL MEDICINE

## 2022-10-08 RX ORDER — LIDOCAINE HYDROCHLORIDE 10 MG/ML
INJECTION, SOLUTION EPIDURAL; INFILTRATION; INTRACAUDAL; PERINEURAL
Status: DISCONTINUED | OUTPATIENT
Start: 2022-10-08 | End: 2022-10-08

## 2022-10-08 RX ORDER — PROPOFOL 10 MG/ML
VIAL (ML) INTRAVENOUS
Status: DISCONTINUED | OUTPATIENT
Start: 2022-10-08 | End: 2022-10-08

## 2022-10-08 RX ORDER — DEXTROSE MONOHYDRATE AND SODIUM CHLORIDE 5; .45 G/100ML; G/100ML
INJECTION, SOLUTION INTRAVENOUS CONTINUOUS
Status: DISCONTINUED | OUTPATIENT
Start: 2022-10-08 | End: 2022-10-09 | Stop reason: HOSPADM

## 2022-10-08 RX ORDER — HYDROCODONE BITARTRATE AND ACETAMINOPHEN 500; 5 MG/1; MG/1
TABLET ORAL
Status: DISCONTINUED | OUTPATIENT
Start: 2022-10-08 | End: 2022-10-09 | Stop reason: HOSPADM

## 2022-10-08 RX ORDER — SODIUM CHLORIDE, SODIUM LACTATE, POTASSIUM CHLORIDE, CALCIUM CHLORIDE 600; 310; 30; 20 MG/100ML; MG/100ML; MG/100ML; MG/100ML
INJECTION, SOLUTION INTRAVENOUS CONTINUOUS PRN
Status: DISCONTINUED | OUTPATIENT
Start: 2022-10-08 | End: 2022-10-08

## 2022-10-08 RX ADMIN — PROPOFOL 20 MG: 10 INJECTION, EMULSION INTRAVENOUS at 01:10

## 2022-10-08 RX ADMIN — MAGNESIUM OXIDE TAB 400 MG (241.3 MG ELEMENTAL MG) 800 MG: 400 (241.3 MG) TAB at 02:10

## 2022-10-08 RX ADMIN — POTASSIUM BICARBONATE 35 MEQ: 391 TABLET, EFFERVESCENT ORAL at 02:10

## 2022-10-08 RX ADMIN — MAGNESIUM OXIDE TAB 400 MG (241.3 MG ELEMENTAL MG) 800 MG: 400 (241.3 MG) TAB at 08:10

## 2022-10-08 RX ADMIN — PROPOFOL 100 MG: 10 INJECTION, EMULSION INTRAVENOUS at 01:10

## 2022-10-08 RX ADMIN — GLYCOPYRROLATE 0.2 MG: 0.2 INJECTION, SOLUTION INTRAMUSCULAR; INTRAVITREAL at 01:10

## 2022-10-08 RX ADMIN — GABAPENTIN 100 MG: 100 CAPSULE ORAL at 08:10

## 2022-10-08 RX ADMIN — MORPHINE SULFATE 4 MG: 4 INJECTION INTRAVENOUS at 01:10

## 2022-10-08 RX ADMIN — SODIUM CHLORIDE, SODIUM LACTATE, POTASSIUM CHLORIDE, AND CALCIUM CHLORIDE: .6; .31; .03; .02 INJECTION, SOLUTION INTRAVENOUS at 01:10

## 2022-10-08 RX ADMIN — Medication 10 ML: at 09:10

## 2022-10-08 RX ADMIN — Medication 10 ML: at 12:10

## 2022-10-08 RX ADMIN — LIDOCAINE HYDROCHLORIDE 50 MG: 10 INJECTION, SOLUTION EPIDURAL; INFILTRATION; INTRACAUDAL; PERINEURAL at 01:10

## 2022-10-08 RX ADMIN — POTASSIUM BICARBONATE 35 MEQ: 391 TABLET, EFFERVESCENT ORAL at 10:10

## 2022-10-08 RX ADMIN — Medication 6 MG: at 09:10

## 2022-10-08 RX ADMIN — GABAPENTIN 100 MG: 100 CAPSULE ORAL at 09:10

## 2022-10-08 RX ADMIN — PANTOPRAZOLE SODIUM 40 MG: 40 INJECTION, POWDER, FOR SOLUTION INTRAVENOUS at 08:10

## 2022-10-08 RX ADMIN — DEXTROSE AND SODIUM CHLORIDE: 5; .45 INJECTION, SOLUTION INTRAVENOUS at 10:10

## 2022-10-08 RX ADMIN — GABAPENTIN 100 MG: 100 CAPSULE ORAL at 02:10

## 2022-10-08 RX ADMIN — PANTOPRAZOLE SODIUM 40 MG: 40 INJECTION, POWDER, FOR SOLUTION INTRAVENOUS at 09:10

## 2022-10-08 RX ADMIN — CITALOPRAM HYDROBROMIDE 10 MG: 10 TABLET ORAL at 08:10

## 2022-10-08 RX ADMIN — DOXEPIN HYDROCHLORIDE 25 MG: 25 CAPSULE ORAL at 09:10

## 2022-10-08 RX ADMIN — Medication 10 ML: at 02:10

## 2022-10-08 NOTE — ASSESSMENT & PLAN NOTE
-Initial troponin 0.033  --Patient reported  SOB, diaphoresis, dizziness, N/V  -Cardiology was consulted by primary team.   10/08:  --troponin normalized  --pt denies CP  --Echo normal with moderate AI  --cardiology cleared pt for GI workup

## 2022-10-08 NOTE — PLAN OF CARE
Discussed Plan of Care with patient and verbalized understanding - Patient remains AAOx4 - remains free of falls, accidents and trauma during the day shift. Bed is in the low position and the call light is within reach. Magnesium and Potassium replaced per PRN MAR, Echo and EGD completed, patient receiving 1 unit PRBC with labs to follow. Will continue to monitor

## 2022-10-08 NOTE — SUBJECTIVE & OBJECTIVE
Past Medical History:   Diagnosis Date    Anxiety disorder, unspecified     COPD (chronic obstructive pulmonary disease)     Depression     Depression     Fever 8/13/2017    GERD (gastroesophageal reflux disease)     Hypertension     Normocytic anemia     Upper respiratory tract infection 8/13/2017       Past Surgical History:   Procedure Laterality Date    FRACTURE SURGERY      Right knee reconstruction from [facility]       Review of patient's allergies indicates:  No Known Allergies    No current facility-administered medications on file prior to encounter.     Current Outpatient Medications on File Prior to Encounter   Medication Sig    citalopram (CELEXA) 10 MG tablet Take 10 mg by mouth once daily.    doxepin (SINEQUAN) 25 MG capsule Take 25 mg by mouth every evening.    gabapentin (NEURONTIN) 100 MG capsule Take 100 mg by mouth 3 (three) times daily.    ibuprofen (ADVIL,MOTRIN) 800 MG tablet Take 1 tablet (800 mg total) by mouth every 6 (six) hours as needed for Pain.    MIRTAZAPINE (REMERON ORAL) Take by mouth.    traMADoL (ULTRAM) 50 mg tablet Take 1 tablet (50 mg total) by mouth every 6 (six) hours as needed for Pain.    albuterol-ipratropium 2.5mg-0.5mg/3mL (DUO-NEB) 0.5 mg-3 mg(2.5 mg base)/3 mL nebulizer solution Take 3 mLs by nebulization every 6 (six) hours as needed for Wheezing. Rescue    metoclopramide HCl (REGLAN) 10 MG tablet Take 1 tablet (10 mg total) by mouth every 6 (six) hours as needed. Prn headache or nausea    metroNIDAZOLE (METROGEL) 0.75 % vaginal gel Place 1 applicator vaginally 2 (two) times daily.    ondansetron (ZOFRAN) 4 MG tablet Take 1 tablet (4 mg total) by mouth every 8 (eight) hours as needed.     Family History       Problem Relation (Age of Onset)    COPD Mother    Cancer Paternal Grandmother    Neuropathy Father          Tobacco Use    Smoking status: Former     Packs/day: 1.00     Years: 35.00     Pack years: 35.00     Types: Cigarettes    Smokeless tobacco: Never   Substance and  Sexual Activity    Alcohol use: No    Drug use: No    Sexual activity: Yes     Partners: Male     Review of Systems   Constitutional: Positive for malaise/fatigue. Negative for chills, diaphoresis, night sweats, weight gain and weight loss.   HENT:  Negative for congestion, hoarse voice, sore throat and stridor.    Eyes:  Negative for double vision and pain.   Cardiovascular:  Negative for chest pain, claudication, cyanosis, dyspnea on exertion, irregular heartbeat, leg swelling, near-syncope, orthopnea, palpitations, paroxysmal nocturnal dyspnea and syncope.   Respiratory:  Negative for cough, hemoptysis, shortness of breath, sleep disturbances due to breathing, snoring, sputum production and wheezing.    Endocrine: Negative for cold intolerance, heat intolerance and polydipsia.   Hematologic/Lymphatic: Negative for bleeding problem. Does not bruise/bleed easily.   Skin:  Negative for color change, dry skin and rash.   Musculoskeletal:  Negative for joint swelling and muscle cramps.   Gastrointestinal:  Negative for bloating, abdominal pain, constipation, diarrhea, dysphagia, melena, nausea and vomiting.   Genitourinary:  Negative for flank pain and urgency.   Neurological:  Negative for dizziness, focal weakness, headaches, light-headedness, loss of balance, seizures and weakness.   Psychiatric/Behavioral:  Negative for altered mental status and memory loss. The patient is not nervous/anxious.    Objective:     Vital Signs (Most Recent):  Temp: 99 °F (37.2 °C) (10/08/22 0655)  Pulse: 100 (10/08/22 0900)  Resp: 18 (10/08/22 0655)  BP: 121/60 (10/08/22 0900)  SpO2: 96 % (10/08/22 0655) Vital Signs (24h Range):  Temp:  [97.9 °F (36.6 °C)-99 °F (37.2 °C)] 99 °F (37.2 °C)  Pulse:  [] 100  Resp:  [16-24] 18  SpO2:  [96 %-99 %] 96 %  BP: (121-199)/(60-78) 121/60     Weight: 73 kg (161 lb)  Body mass index is 27.64 kg/m².    SpO2: 96 %  O2 Device (Oxygen Therapy): room air      Intake/Output Summary (Last 24 hours)  at 10/8/2022 1043  Last data filed at 10/7/2022 1609  Gross per 24 hour   Intake 1000 ml   Output --   Net 1000 ml       Lines/Drains/Airways       Peripheral Intravenous Line  Duration                  Peripheral IV - Single Lumen 10/07/22 1506 18 G Left Antecubital <1 day                    Physical Exam  Eyes:      Pupils: Pupils are equal, round, and reactive to light.   Neck:      Trachea: No tracheal deviation.   Cardiovascular:      Rate and Rhythm: Normal rate and regular rhythm.      Pulses: Intact distal pulses.           Carotid pulses are 2+ on the right side and 2+ on the left side.       Radial pulses are 2+ on the right side and 2+ on the left side.        Femoral pulses are 2+ on the right side and 2+ on the left side.       Popliteal pulses are 2+ on the right side and 2+ on the left side.        Dorsalis pedis pulses are 2+ on the right side and 2+ on the left side.        Posterior tibial pulses are 2+ on the right side and 2+ on the left side.      Heart sounds: Normal heart sounds. No murmur heard.    No friction rub. No gallop.   Pulmonary:      Effort: Pulmonary effort is normal. No respiratory distress.      Breath sounds: Normal breath sounds. No stridor. No wheezing or rales.   Chest:      Chest wall: No tenderness.   Abdominal:      General: There is no distension.      Tenderness: There is no abdominal tenderness. There is no rebound.   Musculoskeletal:         General: No tenderness.      Cervical back: Normal range of motion.   Skin:     General: Skin is warm and dry.   Neurological:      Mental Status: She is alert and oriented to person, place, and time.       Significant Labs: CBC   Recent Labs   Lab 10/07/22  1505 10/08/22  0559 10/08/22  0908   WBC 6.66 3.80* 3.60*   HGB 10.0* 8.2* 7.7*   HCT 29.0* 25.3* 23.3*    175 178    and Troponin   Recent Labs   Lab 10/07/22  1932 10/07/22  2142 10/08/22  0234   TROPONINI 0.017 0.009 0.006       Significant Imaging: Echocardiogram:  Transthoracic echo (TTE) complete (Cupid Only):   Results for orders placed or performed during the hospital encounter of 10/07/22   Echo   Result Value Ref Range    BSA 1.82 m2    TDI SEPTAL 0.07 m/s    LV LATERAL E/E' RATIO 12.29 m/s    LV SEPTAL E/E' RATIO 12.29 m/s    LA WIDTH 3.80 cm    IVC diameter 1.25 cm    Left Ventricular Outflow Tract Mean Velocity 0.88 cm/s    Left Ventricular Outflow Tract Mean Gradient 3.39 mmHg    TDI LATERAL 0.07 m/s    LVIDd 4.04 3.5 - 6.0 cm    IVS 1.84 (A) 0.6 - 1.1 cm    Posterior Wall 1.54 (A) 0.6 - 1.1 cm    Ao root annulus 3.17 cm    LVIDs 3.02 2.1 - 4.0 cm    FS 25 28 - 44 %    LA volume 61.79 cm3    Sinus 2.77 cm    STJ 2.50 cm    Ascending aorta 2.65 cm    LV mass 285.63 g    LA size 3.99 cm    TAPSE 2.22 cm    Left Ventricle Relative Wall Thickness 0.76 cm    AV regurgitation pressure 1/2 time [id number] ms    AV mean gradient 12 mmHg    AV valve area 1.53 cm2    AV Velocity Ratio 0.51     AV index (prosthetic) 0.52     MV valve area p 1/2 method 2.43 cm2    PV peak gradient 1.94 mmHg    E/A ratio 0.69     Mean e' 0.07 m/s    E wave deceleration time 311.86 msec    IVRT 79.92 msec    Pulm vein S/D ratio 1.61     LVOT diameter 1.93 cm    LVOT area 2.9 cm2    LVOT peak vasiliy 1.22 m/s    LVOT peak VTI 23.80 cm    Ao peak vasiliy 2.37 m/s    Ao VTI 45.4 cm    RVOT peak vasiliy 0.70 m/s    RVOT peak VTI 11.9 cm    Mr max vasiliy 5.22 m/s    LVOT stroke volume 69.59 cm3    AV peak gradient 22 mmHg    PV mean gradient 1.25 mmHg    E/E' ratio 12.29 m/s    MV Peak E Vasiliy 0.86 m/s    AR Max Vasiliy 4.68 m/s    TR Max Vasiliy 2.74 m/s    MV stenosis pressure 1/2 time 90.44 ms    MV Peak A Vasiliy 1.25 m/s    PV Peak S Vasiliy 0.61 m/s    PV Peak D Vasiliy 0.38 m/s    LV Systolic Volume 35.54 mL    LV Systolic Volume Index 20.0 mL/m2    LV Diastolic Volume 71.68 mL    LV Diastolic Volume Index 40.27 mL/m2    LA Volume Index 34.7 mL/m2    LV Mass Index 160 g/m2    RA Major Axis 3.72 cm    Left Atrium Minor Axis  4.82 cm    Left Atrium Major Axis 4.77 cm    Triscuspid Valve Regurgitation Peak Gradient 30 mmHg    RA Width 2.50 cm

## 2022-10-08 NOTE — TRANSFER OF CARE
"Anesthesia Transfer of Care Note    Patient: Morenita White    Procedure(s) Performed: Procedure(s) (LRB):  EGD (ESOPHAGOGASTRODUODENOSCOPY) (N/A)    Patient location: PACU    Anesthesia Type: MAC    Transport from OR: Transported from OR on room air with adequate spontaneous ventilation    Post pain: adequate analgesia    Post assessment: no apparent anesthetic complications and tolerated procedure well    Post vital signs: stable    Level of consciousness: sedated    Nausea/Vomiting: no nausea/vomiting    Complications: none    Transfer of care protocol was followed      Last vitals:   Visit Vitals  BP (!) 163/76   Pulse 98   Temp 37.3 °C (99.1 °F)   Resp 17   Ht 5' 4" (1.626 m)   Wt 73 kg (161 lb)   SpO2 98%   Breastfeeding No   BMI 27.64 kg/m²     "

## 2022-10-08 NOTE — ANESTHESIA PREPROCEDURE EVALUATION
10/08/2022  Morenita White is a 52 y.o., female.      Pre-op Assessment    I have reviewed the Patient Summary Reports.     I have reviewed the Nursing Notes. I have reviewed the NPO Status.   I have reviewed the Medications.     Review of Systems  Anesthesia Hx:  No problems with previous Anesthesia  Denies Family Hx of Anesthesia complications.   Denies Personal Hx of Anesthesia complications.   Social:  Former Smoker    Hematology/Oncology:     Oncology Normal    -- Anemia:   Cardiovascular:   Hypertension Denies MI.   Denies CABG/stent.   Denies CHF. ECG has been reviewed. ekg 10/2022:  Sinus tachycardia 113  Cannot rule out Anterior infarct ,age undetermined   Abnormal ECG   When compared with ECG of 07-OCT-2022 14:15,   ST no longer depressed in Inferior leads   ST no longer depressed in Lateral leads   Nonspecific T wave abnormality has replaced inverted T waves in Inferior   leads   T wave inversion no longer evident in Lateral leads   Pulmonary:   COPD Denies Asthma.  Denies Sleep Apnea.    Renal/:  Renal/ Normal     Hepatic/GI:   GERD Denies Liver Disease. Denies Hepatitis.    Musculoskeletal:  Musculoskeletal Normal    Neurological:   Denies CVA. Denies Seizures.    Endocrine:   Denies Diabetes. Denies Hypothyroidism. Denies Hyperthyroidism.    Psych:   anxiety depression          Physical Exam    Airway:  Mallampati: II   Mouth Opening: Normal    Dental:  Intact    Chest/Lungs:  Clear to auscultation, Normal Respiratory Rate    Heart:  Rate: Normal  Rhythm: Regular Rhythm        Anesthesia Plan  Type of Anesthesia, risks & benefits discussed:    Anesthesia Type: MAC  Intra-op Monitoring Plan: Standard ASA Monitors  Induction:  IV  Informed Consent: Informed consent signed with the Patient and all parties understand the risks and agree with anesthesia plan.  All questions answered.   ASA  Score: 3  Day of Surgery Review of History & Physical: H&P Update referred to the surgeon/provider.    Ready For Surgery From Anesthesia Perspective.     .

## 2022-10-08 NOTE — PROGRESS NOTES
Saline locked IV fluids and removed monitor, notified monitor tech - patient to Endo via wheelchair without  apparent distress noted.

## 2022-10-08 NOTE — ASSESSMENT & PLAN NOTE
--hgb 10.0, baseline 10.2  --pt reports dark stool and regular ibuprofen use  --GI bleed pathway initiated  --GI consulted by ED  --Trend CBC  --type & screen - transfuse if hgb falls below 8  10/08:  --hgb 7.7, will transfuse 1u pRBC

## 2022-10-08 NOTE — HOSPITAL COURSE
Pt was kept on OBS for elevated troponin and cardiology was consulted. She was made NPO for abd pain and GI was consulted by ED. She was started on IV protonix  10/08:  Hgb decreased to 7.7 - will transfuse 1u pRBC. Cardiology cleared pt - troponin WNL X 2, echo normal with moderate AI. Discussed with GI - plans on EGD today at 1pm. EGD showed gastric ulcers. They were clean based. No evidence of active bleeding. Will monitor overnight.  10/09: Hgb stable today with no bleeding noted. Patient will need PPI PO BID for 12 weeks then OP f/u with GI for repeat EGD. Rx for Pantoprazole sent to pharmacy. Pt verbalized understanding of all. Patient high risk for readmission. Referred to NP at home program for ongoing management, to increase compliance, and decrease readmission. Patient was seen and examined today and deemed stable for discharge home.

## 2022-10-08 NOTE — PROGRESS NOTES
Received patient for care via stretcher, patient ambulated from stretcher to bed without difficulty - Vital signs taken, monitor placed on patient and verified with monitor tech - started asking Admit questions - non-slip socks given to patient, call light given to patient.

## 2022-10-08 NOTE — [employer]ATION PATIENT INSTRUCTIONS
Discharge Summary/Instructions after an Endoscopic Procedure  Patient Name: Morenita White  Patient MRN: [id number]  Patient YOB: 1970 Saturday, October 8, 2022 Ervin Mazariegos MD  Dear patient,  As a result of recent federal legislation (The Federal Cures Act), you may   receive lab or pathology results from your procedure in your [facility]   account before your physician is able to contact you. Your physician or   their representative will relay the results to you with their   recommendations at their soonest availability.  Thank you,  RESTRICTIONS:  During your procedure today, you received medications for sedation.  These   medications may affect your judgment, balance and coordination.  Therefore,   for 24 hours, you have the following restrictions:   - DO NOT drive a car, operate machinery, make legal/financial decisions,   sign important papers or drink alcohol.    ACTIVITY:  Today: no heavy lifting, straining or running due to procedural   sedation/anesthesia.  The following day: return to full activity including work.  DIET:  Eat and drink normally unless instructed otherwise.     TREATMENT FOR COMMON SIDE EFFECTS:  - Mild abdominal pain, nausea, belching, bloating or excessive gas:  rest,   eat lightly and use a heating pad.  - Sore Throat: treat with throat lozenges and/or gargle with warm salt   water.  - Because air was used during the procedure, expelling large amounts of air   from your rectum or belching is normal.  - If a bowel prep was taken, you may not have a bowel movement for 1-3 days.    This is normal.  SYMPTOMS TO WATCH FOR AND REPORT TO YOUR PHYSICIAN:  1. Abdominal pain or bloating, other than gas cramps.  2. Chest pain.  3. Back pain.  4. Signs of infection such as: chills or fever occurring within 24 hours   after the procedure.  5. Rectal bleeding, which would show as bright red, maroon, or black stools.   (A tablespoon of blood from the rectum is not serious,  especially if   hemorrhoids are present.)  6. Vomiting.  7. Weakness or dizziness.  GO DIRECTLY TO THE NEAREST EMERGENCY ROOM IF YOU HAVE ANY OF THE FOLLOWING:      Difficulty breathing              Chills and/or fever over 101 F   Persistent vomiting and/or vomiting blood   Severe abdominal pain   Severe chest pain   Black, tarry stools   Bleeding- more than one tablespoon   Any other symptom or condition that you feel may need urgent attention  Your doctor recommends these additional instructions:  If any biopsies were taken, your doctors clinic will contact you in 1 to 2   weeks with any results.  - Return patient to [facility] for ongoing care.   - Resume previous diet.   - Continue present medications.  -PPI PO BID for 12 weeks.   -Repeat EGD in 12 weeks.   -We will arrange follow-up in GI clinic.  For questions, problems or results please call your physician Ervin Mazariegos MD at Work:  [phone]  If you have any questions about the above instructions, call the GI   department at [phone] or call the endoscopy unit at [phone]   from 7am until 3 pm.  [facility] [place], EMERGENCY ROOM PHONE NUMBER:   [phone]  IF A COMPLICATION OR EMERGENCY SITUATION ARISES AND YOU ARE UNABLE TO REACH   YOUR PHYSICIAN - GO DIRECTLY TO THE EMERGENCY ROOM.  I have read or have had read to me these discharge instructions for my   procedure and have received a written copy.  I understand these   instructions and will follow-up with my physician if I have any questions.     __________________________________       _____________________________________  Nurse Signature                                          Patient/Designated   Responsible Party Signature  Ervin Mazariegos MD  10/8/2022 1:15:45 PM  This report has been verified and signed electronically.  Dear patient,  As a result of recent federal legislation (The Federal Cures Act), you may   receive lab or pathology results  from your procedure in your [facility]   account before your physician is able to contact you. Your physician or   their representative will relay the results to you with their   recommendations at their soonest availability.  Thank you,  PROVATION
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
## 2022-10-08 NOTE — CONSULTS
[facility] Surg 3  [facility] Medicine  Consult Note    Patient Name: Morenita White  MRN: [id number]  Admission Date: 10/7/2022  Hospital Length of Stay: 0 days  Attending Physician: Owen Roque MD   Primary Care Provider: Benitez Harris NP           Patient information was obtained from patient and ER records.     Inpatient consult to Gastroenterology  Consult performed by: Ervin Mazariegos MD  Consult ordered by: NAYAN Medina  Reason for consult: melena      Subjective:     Principal Problem: Elevated troponin    Chief Complaint:   Chief Complaint   Patient presents with    Dizziness     Pt c/o dizziness and fever since Monday. Denies chest pain. Also having N/V/D.         HPI: 51 y/o female with PMHx HTN, COPD, obesity, and GERD who presented to the ED with c/o abd pain that onset gradually over the past week. Pain is epigastric, sharp, a 10/10, and does not migrate.     She states that she had an infected ulcer about 20 years ago that she had to have surgery. This pain feels similar to that but not as bad.   Does not recall ever having an EGD in the past.   Had a colonoscopy 5 years.   Does endorse NSAIDs as least three times a week.   She had a few episodes of melena since Monday.   Did not have any episodes of melena today.     Had an elevated troponin on arrival and chest pain.   Repeat troponins were normal and echo only revealed AI.   Cardiology cleared patient for procedure.       Past Medical History:   Diagnosis Date    Anxiety disorder, unspecified     COPD (chronic obstructive pulmonary disease)     Depression     Depression     Fever 8/13/2017    GERD (gastroesophageal reflux disease)     Hypertension     Normocytic anemia     Upper respiratory tract infection 8/13/2017       Past Surgical History:   Procedure Laterality Date    FRACTURE SURGERY      Right knee reconstruction from [facility]       Review of patient's allergies indicates:  No Known Allergies    No current  facility-administered medications on file prior to encounter.     Current Outpatient Medications on File Prior to Encounter   Medication Sig    citalopram (CELEXA) 10 MG tablet Take 10 mg by mouth once daily.    doxepin (SINEQUAN) 25 MG capsule Take 25 mg by mouth every evening.    gabapentin (NEURONTIN) 100 MG capsule Take 100 mg by mouth 3 (three) times daily.    ibuprofen (ADVIL,MOTRIN) 800 MG tablet Take 1 tablet (800 mg total) by mouth every 6 (six) hours as needed for Pain.    MIRTAZAPINE (REMERON ORAL) Take by mouth.    traMADoL (ULTRAM) 50 mg tablet Take 1 tablet (50 mg total) by mouth every 6 (six) hours as needed for Pain.    albuterol-ipratropium 2.5mg-0.5mg/3mL (DUO-NEB) 0.5 mg-3 mg(2.5 mg base)/3 mL nebulizer solution Take 3 mLs by nebulization every 6 (six) hours as needed for Wheezing. Rescue    metoclopramide HCl (REGLAN) 10 MG tablet Take 1 tablet (10 mg total) by mouth every 6 (six) hours as needed. Prn headache or nausea    metroNIDAZOLE (METROGEL) 0.75 % vaginal gel Place 1 applicator vaginally 2 (two) times daily.    ondansetron (ZOFRAN) 4 MG tablet Take 1 tablet (4 mg total) by mouth every 8 (eight) hours as needed.     Family History       Problem Relation (Age of Onset)    COPD Mother    Cancer Paternal Grandmother    Neuropathy Father          Tobacco Use    Smoking status: Former     Packs/day: 1.00     Years: 35.00     Pack years: 35.00     Types: Cigarettes    Smokeless tobacco: Never   Substance and Sexual Activity    Alcohol use: No    Drug use: No    Sexual activity: Yes     Partners: Male     Review of Systems   Cardiovascular:  Positive for chest pain.   Gastrointestinal:  Positive for abdominal pain and blood in stool.   Objective:     Vital Signs (Most Recent):  Temp: 99 °F (37.2 °C) (10/08/22 0655)  Pulse: 100 (10/08/22 0900)  Resp: 18 (10/08/22 0655)  BP: 121/60 (10/08/22 0900)  SpO2: 96 % (10/08/22 0655) Vital Signs (24h Range):  Temp:  [97.9 °F (36.6 °C)-99 °F (37.2 °C)] 99 °F  (37.2 °C)  Pulse:  [] 100  Resp:  [16-24] 18  SpO2:  [96 %-99 %] 96 %  BP: (121-199)/(60-78) 121/60     Weight: 73 kg (161 lb)  Body mass index is 27.64 kg/m².    Physical Exam  Constitutional:       Appearance: Normal appearance.   Neurological:      Mental Status: She is alert.       Significant Labs: All pertinent labs within the past 24 hours have been reviewed.  CBC:   Recent Labs   Lab 10/07/22  1505 10/08/22  0559 10/08/22  0908   WBC 6.66 3.80* 3.60*   HGB 10.0* 8.2* 7.7*   HCT 29.0* 25.3* 23.3*    175 178       Significant Imaging: I have reviewed all pertinent imaging results/findings within the past 24 hours.    Assessment/Plan:     * Elevated troponin  -Initial troponin 0.033  --Patient reported  SOB, diaphoresis, dizziness, N/V  -Cardiology was consulted by primary team.       COPD (chronic obstructive pulmonary disease)  -per primary team       Normocytic anemia        Depression  Management per primary team         Anxiety  --Management per primary team      Melena  Plan:   -PPI IV BID   -NPO   -Hold DVT prophylaxis  -Avoid NSAIDs    -We can plan for EGD today as cardiology has cleared her for procedure.       VTE Risk Mitigation (From admission, onward)           Ordered     IP VTE LOW RISK PATIENT  Once         10/07/22 1818     Place sequential compression device  Until discontinued         10/07/22 1818                        Thank you for your consult. I will follow-up with patient. Please contact us if you have any additional questions.    Ervin Mazariegos MD  Department of Hospital Medicine   [facility]     05-Feb-2021 10:33

## 2022-10-08 NOTE — PROGRESS NOTES
[facility] [address] [place] [address] Medicine  Progress Note    Patient Name: Morenita White  MRN: [id number]  Patient Class: OP- Observation   Admission Date: 10/7/2022  Length of Stay: 0 days  Attending Physician: Owen Roque MD  Primary Care Provider: Benitez Harris NP        Subjective:     Principal Problem:Elevated troponin        HPI:  53 y/o female with PMHx HTN, COPD, obesity, and GERD who presented to the ED with c/o abd pain that onset gradually over the past week. Pain is epigastric, sharp, a 10/10, and does not migrate. Sx are constant and moderate in severity. No exacerbating factors reported. Mild relief with vomiting. Associated sx include SOB, dizziness, diaphoresis, nausea, vomiting, melena, and decreased appetite. Pt denies fever, chills, cough, CP, dysuria, and all other sx at this time.      Overview/Hospital Course:  Pt was kept on OBS for elevated troponin and cardiology was consulted. She was made NPO for abd pain and GI was consulted by ED. She was started on IV protonix      Interval History: Hgb decreased to 7.7 - will transfuse 1u pRBC. Cardiology cleared pt - troponin WNL X 2, echo normal with moderate AI. Discussed with GI - plans on EGD today at 1pm.     Review of Systems   Constitutional:  Negative for activity change, appetite change, chills, diaphoresis, fatigue and fever.   HENT:  Negative for congestion, dental problem, ear pain, hearing loss, mouth sores, sinus pressure, sore throat, tinnitus and trouble swallowing.    Eyes:  Negative for pain, discharge, redness and visual disturbance.   Respiratory:  Negative for apnea, cough, choking, chest tightness, shortness of breath and wheezing.    Cardiovascular:  Negative for chest pain, palpitations and leg swelling.   Gastrointestinal:  Positive for blood in stool and nausea. Negative for abdominal distention, abdominal pain, anal bleeding, constipation, diarrhea, rectal pain and vomiting.   Endocrine: Negative for cold intolerance,  heat intolerance, polydipsia and polyuria.   Genitourinary:  Negative for difficulty urinating, dysuria, flank pain, frequency, hematuria and urgency.   Musculoskeletal:  Negative for arthralgias, back pain, gait problem, joint swelling, myalgias, neck pain and neck stiffness.   Skin:  Negative for color change, rash and wound.   Allergic/Immunologic: Negative.    Neurological:  Negative for dizziness, tremors, seizures, syncope, speech difficulty, light-headedness and headaches.   Hematological: Negative.    Psychiatric/Behavioral:  Negative for agitation, behavioral problems, confusion and sleep disturbance. The patient is not nervous/anxious.    All other systems reviewed and are negative.  Objective:     Vital Signs (Most Recent):  Temp: 99 °F (37.2 °C) (10/08/22 0655)  Pulse: 90 (10/08/22 1105)  Resp: 18 (10/08/22 0655)  BP: 121/60 (10/08/22 0900)  SpO2: 96 % (10/08/22 0655) Vital Signs (24h Range):  Temp:  [97.9 °F (36.6 °C)-99 °F (37.2 °C)] 99 °F (37.2 °C)  Pulse:  [] 90  Resp:  [16-24] 18  SpO2:  [96 %-99 %] 96 %  BP: (121-199)/(60-78) 121/60     Weight: 73 kg (161 lb)  Body mass index is 27.64 kg/m².    Intake/Output Summary (Last 24 hours) at 10/8/2022 1137  Last data filed at 10/7/2022 1609  Gross per 24 hour   Intake 1000 ml   Output --   Net 1000 ml      Physical Exam  Vitals and nursing note reviewed.   Constitutional:       General: She is not in acute distress.     Appearance: She is well-developed. She is not diaphoretic.   HENT:      Head: Normocephalic and atraumatic.      Nose: Nose normal.   Eyes:      General: No scleral icterus.     Extraocular Movements: Extraocular movements intact.      Pupils: Pupils are equal, round, and reactive to light.   Neck:      Thyroid: No thyromegaly.      Vascular: No JVD.      Trachea: No tracheal deviation.   Cardiovascular:      Rate and Rhythm: Normal rate and regular rhythm.      Heart sounds: Normal heart sounds. No murmur heard.    No friction rub.  No gallop.   Pulmonary:      Effort: Pulmonary effort is normal. No respiratory distress.      Breath sounds: Normal breath sounds. No wheezing or rales.   Chest:      Chest wall: No tenderness.   Abdominal:      General: Bowel sounds are normal. There is no distension.      Palpations: Abdomen is soft. There is no mass.      Tenderness: There is abdominal tenderness.   Genitourinary:     Comments: deferred  Musculoskeletal:         General: No tenderness or deformity. Normal range of motion.      Cervical back: Normal range of motion and neck supple.   Skin:     General: Skin is warm and dry.      Capillary Refill: Capillary refill takes less than 2 seconds.      Coloration: Skin is not pale.      Findings: No erythema or rash.   Neurological:      Mental Status: She is alert and oriented to person, place, and time. Mental status is at baseline.      Cranial Nerves: No cranial nerve deficit.      Motor: No abnormal muscle tone.      Coordination: Coordination normal.   Psychiatric:         Mood and Affect: Mood normal.         Behavior: Behavior normal.       Significant Labs: All pertinent labs within the past 24 hours have been reviewed.  Recent Lab Results  (Last 5 results in the past 24 hours)        10/08/22  1025   10/08/22  0908   10/08/22  0559   10/08/22  0234   10/07/22  2142        Albumin     3.3           Alkaline Phosphatase     65           ALT     16           Anion Gap     13           Ao root annulus 3.17               Ascending aorta 2.65               Ao peak vasiliy 2.37               Ao VTI 45.4               AR Max Vasiliy 4.68               AST     25           AV valve area 1.53               AV mean gradient 12               AV index (prosthetic) 0.52               AV peak gradient 22               AV regurgitation pressure 1/2 time [id number]               AV Velocity Ratio 0.51               Baso #   0.02   0.02           Basophil %   0.6   0.5           BILIRUBIN TOTAL      0.4  Comment: For infants and newborns, interpretation of results should be based  on gestational age, weight and in agreement with clinical  observations.    Premature Infant recommended reference ranges:  Up to 24 hours.............<8.0 mg/dL  Up to 48 hours............<12.0 mg/dL  3-5 days..................<15.0 mg/dL  6-29 days.................<15.0 mg/dL             BSA 1.82               BUN     26           Calcium     8.7           Chloride     103           CO2     18           Creatinine     0.7           Left Ventricle Relative Wall Thickness 0.76               Differential Method   Automated   Automated           E/A ratio 0.69               E/E' ratio 12.29               eGFR     >60           EF 55               Eos #   0.1   0.1           Eosinophil %   3.1   1.3           E wave deceleration time 311.86               FS 25               Glucose     108           Gran # (ANC)   1.8   2.1           Gran %   49.1   54.5           Hematocrit   23.3   25.3           Hemoglobin   7.7   8.2           Immature Grans (Abs)   0.01  Comment: Mild elevation in immature granulocytes is non specific and   can be seen in a variety of conditions including stress response,   acute inflammation, trauma and pregnancy. Correlation with other   laboratory and clinical findings is essential.     0.01  Comment: Mild elevation in immature granulocytes is non specific and   can be seen in a variety of conditions including stress response,   acute inflammation, trauma and pregnancy. Correlation with other   laboratory and clinical findings is essential.             Immature Granulocytes   0.3   0.3           IVC diameter 1.25               IVRT 79.92               IVSd 1.84               LA WIDTH 3.80               Left Atrium Major Axis 4.77               Left Atrium Minor Axis 4.82               LA size 3.99               LA volume 61.79               LA vol index 34.7               LVOT area 2.9               Lipase      42           LV LATERAL E/E' RATIO 12.29               LV SEPTAL E/E' RATIO 12.29               LV EDV BP 71.68               LV Diastolic Volume Index 40.27               LVIDd 4.04               LVIDs 3.02               LV mass 285.63               LV Mass Index 160               Left Ventricular Outflow Tract Mean Gradient 3.39               Left Ventricular Outflow Tract Mean Velocity 0.88               LVOT diameter 1.93               LVOT peak vasiliy 1.22               LVOT stroke volume 69.59               LVOT peak VTI 23.80               LV ESV BP 35.54               LV Systolic Volume Index 20.0               Lymph #   1.3   1.2           Lymph %   35.8   31.3           Magnesium     1.6           MCH   28.5   28.4           MCHC   33.0   32.4           MCV   86   88           Mean e' 0.07               Mono #   0.4   0.5           Mono %   11.1   12.1           MPV   10.1   10.3           Mr max vasiliy 5.22               MV valve area p 1/2 method 2.43               MV Peak A Vasiliy 1.25               MV Peak E Vasiliy 0.86               MV stenosis pressure 1/2 time 90.44               nRBC   0   0           Phosphorus     3.1           Platelets   178   175           Potassium     3.1           PROTEIN TOTAL     6.5           PV mean gradient 1.25               PV Peak D Vasiliy 0.38               PV Peak S Vasiliy 0.61               Pulm vein S/D ratio 1.61               Posterior Wall 1.54               Right Atrial Pressure (from IVC) 3               RA Major Axis 3.72               RA Width 2.50               RBC   2.70   2.89           RDW   15.8   15.8           RVOT peak vasiliy 0.70               RVOT peak VTI 11.9               Sinus 2.77               Sodium     134           STJ 2.50               TAPSE 2.22               TDI SEPTAL 0.07               TDI LATERAL 0.07               Triscuspid Valve Regurgitation Peak Gradient 30               TR Max Vasiliy 2.74               Troponin I       0.006  Comment: The  reference interval for Troponin I represents the 99th percentile   cutoff   for our facility and is consistent with 3rd generation assay   performance.     0.009  Comment: The reference interval for Troponin I represents the 99th percentile   cutoff   for our facility and is consistent with 3rd generation assay   performance.         TV rest pulmonary artery pressure 33               WBC   3.60   3.80                                  Significant Imaging: I have reviewed all pertinent imaging results/findings within the past 24 hours.      Assessment/Plan:      * Elevated troponin  -Initial troponin 0.033  --Patient reported  SOB, diaphoresis, dizziness, N/V  -Cardiology was consulted by primary team.   10/08:  --troponin normalized  --pt denies CP  --Echo normal with moderate AI  --cardiology cleared pt for GI workup    COPD (chronic obstructive pulmonary disease)  --stable   --PRN duo nebs      Normocytic anemia  --hgb 10.0, baseline 10.2  --pt reports dark stool and regular ibuprofen use  --GI bleed pathway initiated  --GI consulted by ED  --Trend CBC  --type & screen - transfuse if hgb falls below 8  10/08:  --hgb 7.7, will transfuse 1u pRBC      Depression  Patient has persistent depression which is mild and is currently controlled. Will Continue anti-depressant medications. We will not consult psychiatry at this time. Patient does not display psychosis at this time. Continue to monitor closely and adjust plan of care as needed.        Anxiety  --PRN ativan  --supportive care      Melena  Plan:   -PPI IV BID   -Clear liquid diet   -NPO after MN   -Hold DVT prophylaxis  -Avoid NSAIDs    -We can plan for EGD during this hospitalization after patient is evaluated by cardiology for chest pain and elevated troponins   10/08:  --hgb decreased to 7.7 overnight, will transfuse 1u pRBC  --IV protonix  --GI consulted, plans on EGD today            VTE Risk Mitigation (From admission, onward)         Ordered     IP VTE LOW  RISK PATIENT  Once         10/07/22 1818     Place sequential compression device  Until discontinued         10/07/22 1818                Discharge Planning   MARIA L:      Code Status: Full Code   Is the patient medically ready for discharge?:     Reason for patient still in hospital (select all that apply): Patient trending condition, Treatment and Consult recommendations                     CANDIE Smith-C  Department of Hospital Medicine   [facility]

## 2022-10-08 NOTE — ANESTHESIA POSTPROCEDURE EVALUATION
Anesthesia Post Evaluation    Patient: Morenita White    Procedure(s) Performed: Procedure(s) (LRB):  EGD (ESOPHAGOGASTRODUODENOSCOPY) (N/A)    Final Anesthesia Type: MAC      Patient location during evaluation: PACU  Patient participation: No - Unable to Participate, Sedation  Level of consciousness: sedated  Post-procedure vital signs: reviewed and stable  Pain management: adequate  Airway patency: patent    PONV status at discharge: No PONV  Anesthetic complications: no      Cardiovascular status: blood pressure returned to baseline and hemodynamically stable  Respiratory status: unassisted and spontaneous ventilation  Hydration status: euvolemic  Follow-up not needed.          Vitals Value Taken Time   /76 10/08/22 1249   Temp 37.3 °C (99.1 °F) 10/08/22 1249   Pulse 98 10/08/22 1249   Resp 17 10/08/22 1249   SpO2 98 % 10/08/22 1249         No case tracking events are documented in the log.      Pain/Anne Score: Pain Rating Prior to Med Admin: 7 (10/8/2022  1:09 AM)  Pain Rating Post Med Admin: 1 (10/8/2022  1:39 AM)

## 2022-10-08 NOTE — SUBJECTIVE & OBJECTIVE
Interval History: Hgb decreased to 7.7 - will transfuse 1u pRBC. Cardiology cleared pt - troponin WNL X 2, echo normal with moderate AI. Discussed with GI - plans on EGD today at 1pm.     Review of Systems   Constitutional:  Negative for activity change, appetite change, chills, diaphoresis, fatigue and fever.   HENT:  Negative for congestion, dental problem, ear pain, hearing loss, mouth sores, sinus pressure, sore throat, tinnitus and trouble swallowing.    Eyes:  Negative for pain, discharge, redness and visual disturbance.   Respiratory:  Negative for apnea, cough, choking, chest tightness, shortness of breath and wheezing.    Cardiovascular:  Negative for chest pain, palpitations and leg swelling.   Gastrointestinal:  Positive for blood in stool and nausea. Negative for abdominal distention, abdominal pain, anal bleeding, constipation, diarrhea, rectal pain and vomiting.   Endocrine: Negative for cold intolerance, heat intolerance, polydipsia and polyuria.   Genitourinary:  Negative for difficulty urinating, dysuria, flank pain, frequency, hematuria and urgency.   Musculoskeletal:  Negative for arthralgias, back pain, gait problem, joint swelling, myalgias, neck pain and neck stiffness.   Skin:  Negative for color change, rash and wound.   Allergic/Immunologic: Negative.    Neurological:  Negative for dizziness, tremors, seizures, syncope, speech difficulty, light-headedness and headaches.   Hematological: Negative.    Psychiatric/Behavioral:  Negative for agitation, behavioral problems, confusion and sleep disturbance. The patient is not nervous/anxious.    All other systems reviewed and are negative.  Objective:     Vital Signs (Most Recent):  Temp: 99 °F (37.2 °C) (10/08/22 0655)  Pulse: 90 (10/08/22 1105)  Resp: 18 (10/08/22 0655)  BP: 121/60 (10/08/22 0900)  SpO2: 96 % (10/08/22 0655) Vital Signs (24h Range):  Temp:  [97.9 °F (36.6 °C)-99 °F (37.2 °C)] 99 °F (37.2 °C)  Pulse:  [] 90  Resp:  [16-24]  18  SpO2:  [96 %-99 %] 96 %  BP: (121-199)/(60-78) 121/60     Weight: 73 kg (161 lb)  Body mass index is 27.64 kg/m².    Intake/Output Summary (Last 24 hours) at 10/8/2022 1137  Last data filed at 10/7/2022 1609  Gross per 24 hour   Intake 1000 ml   Output --   Net 1000 ml      Physical Exam  Vitals and nursing note reviewed.   Constitutional:       General: She is not in acute distress.     Appearance: She is well-developed. She is not diaphoretic.   HENT:      Head: Normocephalic and atraumatic.      Nose: Nose normal.   Eyes:      General: No scleral icterus.     Extraocular Movements: Extraocular movements intact.      Pupils: Pupils are equal, round, and reactive to light.   Neck:      Thyroid: No thyromegaly.      Vascular: No JVD.      Trachea: No tracheal deviation.   Cardiovascular:      Rate and Rhythm: Normal rate and regular rhythm.      Heart sounds: Normal heart sounds. No murmur heard.    No friction rub. No gallop.   Pulmonary:      Effort: Pulmonary effort is normal. No respiratory distress.      Breath sounds: Normal breath sounds. No wheezing or rales.   Chest:      Chest wall: No tenderness.   Abdominal:      General: Bowel sounds are normal. There is no distension.      Palpations: Abdomen is soft. There is no mass.      Tenderness: There is abdominal tenderness.   Genitourinary:     Comments: deferred  Musculoskeletal:         General: No tenderness or deformity. Normal range of motion.      Cervical back: Normal range of motion and neck supple.   Skin:     General: Skin is warm and dry.      Capillary Refill: Capillary refill takes less than 2 seconds.      Coloration: Skin is not pale.      Findings: No erythema or rash.   Neurological:      Mental Status: She is alert and oriented to person, place, and time. Mental status is at baseline.      Cranial Nerves: No cranial nerve deficit.      Motor: No abnormal muscle tone.      Coordination: Coordination normal.   Psychiatric:         Mood and  Affect: Mood normal.         Behavior: Behavior normal.       Significant Labs: All pertinent labs within the past 24 hours have been reviewed.  Recent Lab Results  (Last 5 results in the past 24 hours)        10/08/22  1025   10/08/22  0908   10/08/22  0559   10/08/22  0234   10/07/22  2142        Albumin     3.3           Alkaline Phosphatase     65           ALT     16           Anion Gap     13           Ao root annulus 3.17               Ascending aorta 2.65               Ao peak vasiliy 2.37               Ao VTI 45.4               AR Max Vasiliy 4.68               AST     25           AV valve area 1.53               AV mean gradient 12               AV index (prosthetic) 0.52               AV peak gradient 22               AV regurgitation pressure 1/2 time [id number]               AV Velocity Ratio 0.51               Baso #   0.02   0.02           Basophil %   0.6   0.5           BILIRUBIN TOTAL     0.4  Comment: For infants and newborns, interpretation of results should be based  on gestational age, weight and in agreement with clinical  observations.    Premature Infant recommended reference ranges:  Up to 24 hours.............<8.0 mg/dL  Up to 48 hours............<12.0 mg/dL  3-5 days..................<15.0 mg/dL  6-29 days.................<15.0 mg/dL             BSA 1.82               BUN     26           Calcium     8.7           Chloride     103           CO2     18           Creatinine     0.7           Left Ventricle Relative Wall Thickness 0.76               Differential Method   Automated   Automated           E/A ratio 0.69               E/E' ratio 12.29               eGFR     >60           EF 55               Eos #   0.1   0.1           Eosinophil %   3.1   1.3           E wave deceleration time 311.86               FS 25               Glucose     108           Gran # (ANC)   1.8   2.1           Gran %   49.1   54.5           Hematocrit   23.3   25.3           Hemoglobin   7.7   8.2            Immature Grans (Abs)   0.01  Comment: Mild elevation in immature granulocytes is non specific and   can be seen in a variety of conditions including stress response,   acute inflammation, trauma and pregnancy. Correlation with other   laboratory and clinical findings is essential.     0.01  Comment: Mild elevation in immature granulocytes is non specific and   can be seen in a variety of conditions including stress response,   acute inflammation, trauma and pregnancy. Correlation with other   laboratory and clinical findings is essential.             Immature Granulocytes   0.3   0.3           IVC diameter 1.25               IVRT 79.92               IVSd 1.84               LA WIDTH 3.80               Left Atrium Major Axis 4.77               Left Atrium Minor Axis 4.82               LA size 3.99               LA volume 61.79               LA vol index 34.7               LVOT area 2.9               Lipase     42           LV LATERAL E/E' RATIO 12.29               LV SEPTAL E/E' RATIO 12.29               LV EDV BP 71.68               LV Diastolic Volume Index 40.27               LVIDd 4.04               LVIDs 3.02               LV mass 285.63               LV Mass Index 160               Left Ventricular Outflow Tract Mean Gradient 3.39               Left Ventricular Outflow Tract Mean Velocity 0.88               LVOT diameter 1.93               LVOT peak vasiliy 1.22               LVOT stroke volume 69.59               LVOT peak VTI 23.80               LV ESV BP 35.54               LV Systolic Volume Index 20.0               Lymph #   1.3   1.2           Lymph %   35.8   31.3           Magnesium     1.6           MCH   28.5   28.4           MCHC   33.0   32.4           MCV   86   88           Mean e' 0.07               Mono #   0.4   0.5           Mono %   11.1   12.1           MPV   10.1   10.3           Mr max vasiliy 5.22               MV valve area p 1/2 method 2.43               MV Peak A Vasiliy 1.25               MV Peak  E Vasiliy 0.86               MV stenosis pressure 1/2 time 90.44               nRBC   0   0           Phosphorus     3.1           Platelets   178   175           Potassium     3.1           PROTEIN TOTAL     6.5           PV mean gradient 1.25               PV Peak D Vasiliy 0.38               PV Peak S Vasiliy 0.61               Pulm vein S/D ratio 1.61               Posterior Wall 1.54               Right Atrial Pressure (from IVC) 3               RA Major Axis 3.72               RA Width 2.50               RBC   2.70   2.89           RDW   15.8   15.8           RVOT peak vasiliy 0.70               RVOT peak VTI 11.9               Sinus 2.77               Sodium     134           STJ 2.50               TAPSE 2.22               TDI SEPTAL 0.07               TDI LATERAL 0.07               Triscuspid Valve Regurgitation Peak Gradient 30               TR Max Vasiliy 2.74               Troponin I       0.006  Comment: The reference interval for Troponin I represents the 99th percentile   cutoff   for our facility and is consistent with 3rd generation assay   performance.     0.009  Comment: The reference interval for Troponin I represents the 99th percentile   cutoff   for our facility and is consistent with 3rd generation assay   performance.         TV rest pulmonary artery pressure 33               WBC   3.60   3.80                                  Significant Imaging: I have reviewed all pertinent imaging results/findings within the past 24 hours.

## 2022-10-08 NOTE — HPI
History of Present Illness: Morenita White is a 52 y.o. female patient with a PMHx of depression who presents to the Emergency Department for evaluation of black stool which onset 4 days ago. Pt notes that her stool has the consistency and appearance of apple sauce. Pt reports that she currently takes Lisinopril for her BP and that she has been taking an iron supplement for the past month. Symptoms are constant and mild to moderate in severity. No mitigating or exacerbating factors reported. Associated sxs include SOB, epigastric abdominal pain, fever, weakness, decreased appetite, light-headedness, and vomiting. Patient denies any diarrhea, anal bleeding, blood in stool, chest pain, chills, and all other sxs at this time. No prior Tx reported. No further complaints or concerns at this time.    Patient seen and examined and no chest pain. Troponin elevated on one value but otherwise normal. Stress test last year was normal with 9 METs of activity.  No symptoms and if echo is normal then cleared for GI workup.

## 2022-10-09 VITALS
WEIGHT: 166 LBS | HEART RATE: 83 BPM | DIASTOLIC BLOOD PRESSURE: 62 MMHG | OXYGEN SATURATION: 99 % | TEMPERATURE: 97 F | RESPIRATION RATE: 20 BRPM | BODY MASS INDEX: 28.34 KG/M2 | HEIGHT: 64 IN | SYSTOLIC BLOOD PRESSURE: 133 MMHG

## 2022-10-09 LAB
ALBUMIN SERPL BCP-MCNC: 3 G/DL (ref 3.5–5.2)
ALP SERPL-CCNC: 70 U/L (ref 55–135)
ALT SERPL W/O P-5'-P-CCNC: 15 U/L (ref 10–44)
ANION GAP SERPL CALC-SCNC: 9 MMOL/L (ref 8–16)
AST SERPL-CCNC: 22 U/L (ref 10–40)
BASOPHILS # BLD AUTO: 0.02 K/UL (ref 0–0.2)
BASOPHILS # BLD AUTO: 0.03 K/UL (ref 0–0.2)
BASOPHILS NFR BLD: 0.4 % (ref 0–1.9)
BASOPHILS NFR BLD: 0.8 % (ref 0–1.9)
BILIRUB SERPL-MCNC: 0.2 MG/DL (ref 0.1–1)
BUN SERPL-MCNC: 18 MG/DL (ref 6–20)
CALCIUM SERPL-MCNC: 8.6 MG/DL (ref 8.7–10.5)
CHLORIDE SERPL-SCNC: 108 MMOL/L (ref 95–110)
CO2 SERPL-SCNC: 23 MMOL/L (ref 23–29)
CREAT SERPL-MCNC: 0.8 MG/DL (ref 0.5–1.4)
DIFFERENTIAL METHOD: ABNORMAL
DIFFERENTIAL METHOD: ABNORMAL
EOSINOPHIL # BLD AUTO: 0.1 K/UL (ref 0–0.5)
EOSINOPHIL # BLD AUTO: 0.1 K/UL (ref 0–0.5)
EOSINOPHIL NFR BLD: 1.9 % (ref 0–8)
EOSINOPHIL NFR BLD: 2.3 % (ref 0–8)
ERYTHROCYTE [DISTWIDTH] IN BLOOD BY AUTOMATED COUNT: 15.9 % (ref 11.5–14.5)
ERYTHROCYTE [DISTWIDTH] IN BLOOD BY AUTOMATED COUNT: 15.9 % (ref 11.5–14.5)
EST. GFR  (NO RACE VARIABLE): >60 ML/MIN/1.73 M^2
GLUCOSE SERPL-MCNC: 101 MG/DL (ref 70–110)
HCT VFR BLD AUTO: 28 % (ref 37–48.5)
HCT VFR BLD AUTO: 29.1 % (ref 37–48.5)
HGB BLD-MCNC: 9.4 G/DL (ref 12–16)
HGB BLD-MCNC: 9.5 G/DL (ref 12–16)
IMM GRANULOCYTES # BLD AUTO: 0.01 K/UL (ref 0–0.04)
IMM GRANULOCYTES # BLD AUTO: 0.02 K/UL (ref 0–0.04)
IMM GRANULOCYTES NFR BLD AUTO: 0.3 % (ref 0–0.5)
IMM GRANULOCYTES NFR BLD AUTO: 0.4 % (ref 0–0.5)
LYMPHOCYTES # BLD AUTO: 1.1 K/UL (ref 1–4.8)
LYMPHOCYTES # BLD AUTO: 1.1 K/UL (ref 1–4.8)
LYMPHOCYTES NFR BLD: 22.3 % (ref 18–48)
LYMPHOCYTES NFR BLD: 28.9 % (ref 18–48)
MAGNESIUM SERPL-MCNC: 2 MG/DL (ref 1.6–2.6)
MCH RBC QN AUTO: 28.9 PG (ref 27–31)
MCH RBC QN AUTO: 29.9 PG (ref 27–31)
MCHC RBC AUTO-ENTMCNC: 32.6 G/DL (ref 32–36)
MCHC RBC AUTO-ENTMCNC: 33.6 G/DL (ref 32–36)
MCV RBC AUTO: 88 FL (ref 82–98)
MCV RBC AUTO: 89 FL (ref 82–98)
MONOCYTES # BLD AUTO: 0.3 K/UL (ref 0.3–1)
MONOCYTES # BLD AUTO: 0.5 K/UL (ref 0.3–1)
MONOCYTES NFR BLD: 11 % (ref 4–15)
MONOCYTES NFR BLD: 8.9 % (ref 4–15)
NEUTROPHILS # BLD AUTO: 2.3 K/UL (ref 1.8–7.7)
NEUTROPHILS # BLD AUTO: 3 K/UL (ref 1.8–7.7)
NEUTROPHILS NFR BLD: 58.8 % (ref 38–73)
NEUTROPHILS NFR BLD: 64 % (ref 38–73)
NRBC BLD-RTO: 0 /100 WBC
NRBC BLD-RTO: 0 /100 WBC
PHOSPHATE SERPL-MCNC: 2.4 MG/DL (ref 2.7–4.5)
PLATELET # BLD AUTO: 168 K/UL (ref 150–450)
PLATELET # BLD AUTO: 194 K/UL (ref 150–450)
PMV BLD AUTO: 10.5 FL (ref 9.2–12.9)
PMV BLD AUTO: 10.6 FL (ref 9.2–12.9)
POTASSIUM SERPL-SCNC: 4.4 MMOL/L (ref 3.5–5.1)
PROT SERPL-MCNC: 6.1 G/DL (ref 6–8.4)
RBC # BLD AUTO: 3.14 M/UL (ref 4–5.4)
RBC # BLD AUTO: 3.29 M/UL (ref 4–5.4)
SODIUM SERPL-SCNC: 140 MMOL/L (ref 136–145)
WBC # BLD AUTO: 3.84 K/UL (ref 3.9–12.7)
WBC # BLD AUTO: 4.71 K/UL (ref 3.9–12.7)

## 2022-10-09 PROCEDURE — 85025 COMPLETE CBC W/AUTO DIFF WBC: CPT | Mod: 91 | Performed by: EMERGENCY MEDICINE

## 2022-10-09 PROCEDURE — 84100 ASSAY OF PHOSPHORUS: CPT | Performed by: NURSE PRACTITIONER

## 2022-10-09 PROCEDURE — 90471 IMMUNIZATION ADMIN: CPT | Performed by: EMERGENCY MEDICINE

## 2022-10-09 PROCEDURE — 80053 COMPREHEN METABOLIC PANEL: CPT | Performed by: NURSE PRACTITIONER

## 2022-10-09 PROCEDURE — 85025 COMPLETE CBC W/AUTO DIFF WBC: CPT | Performed by: NURSE PRACTITIONER

## 2022-10-09 PROCEDURE — 90686 IIV4 VACC NO PRSV 0.5 ML IM: CPT | Performed by: EMERGENCY MEDICINE

## 2022-10-09 PROCEDURE — 63600175 PHARM REV CODE 636 W HCPCS: Performed by: EMERGENCY MEDICINE

## 2022-10-09 PROCEDURE — 63600175 PHARM REV CODE 636 W HCPCS: Performed by: NURSE PRACTITIONER

## 2022-10-09 PROCEDURE — C9113 INJ PANTOPRAZOLE SODIUM, VIA: HCPCS | Performed by: NURSE PRACTITIONER

## 2022-10-09 PROCEDURE — 36415 COLL VENOUS BLD VENIPUNCTURE: CPT | Performed by: NURSE PRACTITIONER

## 2022-10-09 PROCEDURE — 83735 ASSAY OF MAGNESIUM: CPT | Performed by: NURSE PRACTITIONER

## 2022-10-09 PROCEDURE — 25000003 PHARM REV CODE 250: Performed by: NURSE PRACTITIONER

## 2022-10-09 PROCEDURE — G0378 HOSPITAL OBSERVATION PER HR: HCPCS

## 2022-10-09 PROCEDURE — 36415 COLL VENOUS BLD VENIPUNCTURE: CPT | Performed by: EMERGENCY MEDICINE

## 2022-10-09 PROCEDURE — 96376 TX/PRO/DX INJ SAME DRUG ADON: CPT

## 2022-10-09 RX ORDER — PANTOPRAZOLE SODIUM 40 MG/1
40 TABLET, DELAYED RELEASE ORAL 2 TIMES DAILY
Qty: 60 TABLET | Refills: 2 | Status: SHIPPED | OUTPATIENT
Start: 2022-10-09 | End: 2023-10-09

## 2022-10-09 RX ADMIN — PANTOPRAZOLE SODIUM 40 MG: 40 INJECTION, POWDER, FOR SOLUTION INTRAVENOUS at 09:10

## 2022-10-09 RX ADMIN — CITALOPRAM HYDROBROMIDE 10 MG: 10 TABLET ORAL at 09:10

## 2022-10-09 RX ADMIN — GABAPENTIN 100 MG: 100 CAPSULE ORAL at 09:10

## 2022-10-09 RX ADMIN — INFLUENZA VIRUS VACCINE 0.5 ML: 15; 15; 15; 15 SUSPENSION INTRAMUSCULAR at 10:10

## 2022-10-09 NOTE — PROGRESS NOTES
Discharge instructions given to patient, verbalized understanding - IV removed without difficulty, pressure dressing applied to site - cardiac monitor removed and returned to monitor tech - patient  ambulated to car without distress noted.

## 2022-10-09 NOTE — DISCHARGE SUMMARY
[facility]  Hospital Medicine  Discharge Summary      Patient Name: Morenita White  MRN: [id number]  Patient Class: OP- Observation  Admission Date: 10/7/2022  Hospital Length of Stay: 0 days  Discharge Date and Time: 10/9/2022 11:30 AM  Attending Physician: No att. providers found   Discharging Provider: NAYAN Smith  Primary Care Provider: Benitez Harris NP      HPI:   51 y/o female with PMHx HTN, COPD, obesity, and GERD who presented to the ED with c/o abd pain that onset gradually over the past week. Pain is epigastric, sharp, a 10/10, and does not migrate. Sx are constant and moderate in severity. No exacerbating factors reported. Mild relief with vomiting. Associated sx include SOB, dizziness, diaphoresis, nausea, vomiting, melena, and decreased appetite. Pt denies fever, chills, cough, CP, dysuria, and all other sx at this time.      Procedure(s) (LRB):  EGD (ESOPHAGOGASTRODUODENOSCOPY) (N/A)      Hospital Course:   Pt was kept on OBS for elevated troponin and cardiology was consulted. She was made NPO for abd pain and GI was consulted by ED. She was started on IV protonix  10/08:  Hgb decreased to 7.7 - will transfuse 1u pRBC. Cardiology cleared pt - troponin WNL X 2, echo normal with moderate AI. Discussed with GI - plans on EGD today at 1pm. EGD showed gastric ulcers. They were clean based. No evidence of active bleeding. Will monitor overnight.  10/09: Hgb stable today with no bleeding noted. Patient will need PPI PO BID for 12 weeks then OP f/u with GI for repeat EGD. Rx for Pantoprazole sent to pharmacy. Pt verbalized understanding of all. Patient high risk for readmission. Referred to [facility] program for ongoing management, to increase compliance, and decrease readmission. Patient was seen and examined today and deemed stable for discharge home.       Goals of Care Treatment Preferences:  Code Status: Full Code      Consults:   Consults (From admission, onward)        Status  Ordering Provider     Inpatient consult to Gastroenterology  Once        Provider:  Ervin Mazariegos MD    Completed HERBER GARNETT     Inpatient consult to Cardiology  Once        Provider:  Artur Hernandez MD    Completed HERBER GARNETT          No new Assessment & Plan notes have been filed under this hospital service since the last note was generated.  Service: Hospital Medicine    Final Active Diagnoses:    Diagnosis Date Noted POA    PRINCIPAL PROBLEM:  Elevated troponin [R77.8] 10/07/2022 Yes    Melena [K92.1] 10/07/2022 Yes    Anxiety [F41.9] 10/07/2022 Yes    Depression [F32.A] 10/07/2022 Yes    Normocytic anemia [D64.9] 10/07/2022 Yes    COPD (chronic obstructive pulmonary disease) [J44.9] 10/07/2022 Yes      Problems Resolved During this Admission:       Discharged Condition: stable    Disposition: Home or Self Care    Follow Up:   Follow-up Information     [facility] Follow up in 10 week(s).    Specialty: Gastroenterology  Why: They should call you with follow up appointment but if you have not heard from them by Thursday please give them a call  Contact information:  [id number] [place] [address] [phone]           Benitez Harris NP Follow up in 3 day(s).    Specialty: Family Medicine  Why: hospital follow up - Please call his office on Monday to make a Hospital Follow up appointment for in 3 days.  Contact information:  [id number] [place] [address] [phone]                       Patient Instructions:      Ambulatory referral/consult to [facility]   Standing Status: Future   Referral Priority: Urgent Referral Type: Consultation   Referral Reason: Specialty Services Required   Number of Visits Requested: 1     Diet Cardiac     Activity as tolerated       Significant Diagnostic Studies: Labs:   BMP:   Recent Labs   Lab 10/08/22  0559 10/08/22  1946 10/09/22  0501     103 101   * 138 140   K 3.1* 4.5 4.4    106 108   CO2 18* 21* 23   BUN 26* 19 18   CREATININE 0.7 0.8 0.8   CALCIUM 8.7 8.6* 8.6*   MG 1.6  --  2.0   , CMP   Recent Labs   Lab 10/07/22  1505 10/08/22  0559 10/08/22  1946 10/09/22  0501   * 134* 138 140   K 3.2* 3.1* 4.5 4.4    103 106 108   CO2 19* 18* 21* 23   * 108 103 101   BUN 36* 26* 19 18   CREATININE 1.2 0.7 0.8 0.8   CALCIUM 10.3 8.7 8.6* 8.6*   PROT 7.6 6.5  --  6.1   ALBUMIN 3.8 3.3*  --  3.0*   BILITOT 0.6 0.4  --  0.2   ALKPHOS 86 65  --  70   AST 31 25  --  22   ALT 19 16  --  15   ANIONGAP 14 13 11 9   , CBC   Recent Labs   Lab 10/08/22  1946 10/09/22  0501 10/09/22  0918   WBC 4.82 3.84* 4.71   HGB 10.1* 9.4* 9.5*   HCT 30.1* 28.0* 29.1*    194 168    and All labs within the past 24 hours have been reviewed    Pending Diagnostic Studies:     Procedure Component Value Units Date/Time    Specimen to Pathology, Surgery Gastrointestinal tract [[id number]] Collected: 10/08/22 1316    Order Status: Sent Lab Status: In process Updated: 10/08/22 1316    Specimen: Tissue          Medications:  Reconciled Home Medications:      Medication List      START taking these medications    pantoprazole 40 MG tablet  Commonly known as: PROTONIX  Take 1 tablet (40 mg total) by mouth 2 (two) times daily.  Notes to patient: Last dose given 10-9-22 at 9:22am by IV        CONTINUE taking these medications    albuterol-ipratropium 2.5 mg-0.5 mg/3 mL nebulizer solution  Commonly known as: DUO-NEB  Take 3 mLs by nebulization every 6 (six) hours as needed for Wheezing. Rescue     citalopram 10 MG tablet  Commonly known as: CeleXA  Take 10 mg by mouth once daily.     doxepin 25 MG capsule  Commonly known as: SINEQUAN  Take 25 mg by mouth every evening.     gabapentin 100 MG capsule  Commonly known as: NEURONTIN  Take 100 mg by mouth 3 (three) times daily.     ibuprofen 800 MG tablet  Commonly known as: ADVIL,MOTRIN  Take 1 tablet (800 mg  total) by mouth every 6 (six) hours as needed for Pain.     metoclopramide HCl 10 MG tablet  Commonly known as: REGLAN  Take 1 tablet (10 mg total) by mouth every 6 (six) hours as needed. Prn headache or nausea     metroNIDAZOLE 0.75 % (37.5mg/5 gram) vaginal gel  Commonly known as: METROGEL  Place 1 applicator vaginally 2 (two) times daily.     ondansetron 4 MG tablet  Commonly known as: ZOFRAN  Take 1 tablet (4 mg total) by mouth every 8 (eight) hours as needed.     REMERON ORAL  Take by mouth.     traMADoL 50 mg tablet  Commonly known as: ULTRAM  Take 1 tablet (50 mg total) by mouth every 6 (six) hours as needed for Pain.            Indwelling Lines/Drains at time of discharge:   Lines/Drains/Airways     None                 Time spent on the discharge of patient: 50 minutes         CANDIE Smith-C  Department of Hospital Medicine  [facility]

## 2022-10-09 NOTE — PLAN OF CARE
[facility]  Discharge Final Note    Primary Care Provider: Benitez Harris NP    Expected Discharge Date: 10/9/2022    Final Discharge Note (most recent)       Final Note - 10/09/22 1032          Final Note    Assessment Type Final Discharge Note (P)      Anticipated Discharge Disposition Home or Self Care (P)         Post-Acute Status    Discharge Delays None known at this time (P)                      Important Message from Medicare             Contact Info       [facility]   Specialty: Gastroenterology    [id number] [place] [address] Drive  [facility] [address]   Phone: [phone]       Next Steps: Follow up in 10 week(s)    Benitez Harris NP   Specialty: Family Medicine   Relationship: PCP - General    [facility]  [address] [facility] [address]   Phone: [phone]       Next Steps: Follow up in 3 day(s)    Instructions: hospital follow up          Dayanna Tovar LMSW 10/9/2022 10:32 AM

## 2022-10-10 ENCOUNTER — PES CALL (OUTPATIENT)
Dept: ADMINISTRATIVE | Facility: CLINIC | Age: 52
End: 2022-10-10
Payer: MEDICAID

## 2022-10-11 ENCOUNTER — PES CALL (OUTPATIENT)
Dept: ADMINISTRATIVE | Facility: CLINIC | Age: 52
End: 2022-10-11
Payer: MEDICAID

## 2022-10-12 ENCOUNTER — PES CALL (OUTPATIENT)
Dept: ADMINISTRATIVE | Facility: CLINIC | Age: 52
End: 2022-10-12
Payer: MEDICAID

## 2022-10-17 LAB
FINAL PATHOLOGIC DIAGNOSIS: NORMAL
GROSS: NORMAL
Lab: NORMAL

## 2025-01-13 NOTE — ASSESSMENT & PLAN NOTE
Plan:   -PPI IV BID   -Clear liquid diet   -NPO after MN   -Hold DVT prophylaxis  -Avoid NSAIDs    -We can plan for EGD during this hospitalization after patient is evaluated by cardiology for chest pain and elevated troponins   10/08:  --hgb decreased to 7.7 overnight, will transfuse 1u pRBC  --IV protonix  --GI consulted, plans on EGD today         [HRA Reviewed] : Health risk assessment reviewed [Independent] : managing finances [No falls in past year] : Patient reported no falls in the past year [No] : The patient does not have visual impairment [TimeGetUpGo] : 10